# Patient Record
Sex: FEMALE | Race: BLACK OR AFRICAN AMERICAN
[De-identification: names, ages, dates, MRNs, and addresses within clinical notes are randomized per-mention and may not be internally consistent; named-entity substitution may affect disease eponyms.]

---

## 2018-02-23 ENCOUNTER — HOSPITAL ENCOUNTER (INPATIENT)
Dept: HOSPITAL 17 - NEPD | Age: 58
LOS: 7 days | Discharge: HOME | DRG: 623 | End: 2018-03-02
Attending: HOSPITALIST | Admitting: HOSPITALIST
Payer: COMMERCIAL

## 2018-02-23 VITALS — BODY MASS INDEX: 42.87 KG/M2 | HEIGHT: 66 IN | WEIGHT: 266.76 LBS

## 2018-02-23 VITALS — SYSTOLIC BLOOD PRESSURE: 247 MMHG | DIASTOLIC BLOOD PRESSURE: 109 MMHG | HEART RATE: 53 BPM

## 2018-02-23 VITALS
SYSTOLIC BLOOD PRESSURE: 205 MMHG | HEART RATE: 75 BPM | DIASTOLIC BLOOD PRESSURE: 92 MMHG | RESPIRATION RATE: 18 BRPM | OXYGEN SATURATION: 100 %

## 2018-02-23 VITALS
HEART RATE: 64 BPM | RESPIRATION RATE: 18 BRPM | DIASTOLIC BLOOD PRESSURE: 116 MMHG | SYSTOLIC BLOOD PRESSURE: 220 MMHG | OXYGEN SATURATION: 100 %

## 2018-02-23 VITALS
DIASTOLIC BLOOD PRESSURE: 82 MMHG | SYSTOLIC BLOOD PRESSURE: 198 MMHG | RESPIRATION RATE: 18 BRPM | OXYGEN SATURATION: 100 % | HEART RATE: 69 BPM

## 2018-02-23 VITALS
TEMPERATURE: 98.4 F | OXYGEN SATURATION: 99 % | SYSTOLIC BLOOD PRESSURE: 231 MMHG | HEART RATE: 63 BPM | RESPIRATION RATE: 16 BRPM | DIASTOLIC BLOOD PRESSURE: 98 MMHG

## 2018-02-23 VITALS
HEART RATE: 61 BPM | DIASTOLIC BLOOD PRESSURE: 104 MMHG | OXYGEN SATURATION: 100 % | RESPIRATION RATE: 23 BRPM | SYSTOLIC BLOOD PRESSURE: 229 MMHG

## 2018-02-23 VITALS
RESPIRATION RATE: 16 BRPM | DIASTOLIC BLOOD PRESSURE: 105 MMHG | SYSTOLIC BLOOD PRESSURE: 240 MMHG | OXYGEN SATURATION: 98 % | HEART RATE: 59 BPM

## 2018-02-23 VITALS — RESPIRATION RATE: 16 BRPM | HEART RATE: 76 BPM | OXYGEN SATURATION: 98 %

## 2018-02-23 DIAGNOSIS — Z79.84: ICD-10-CM

## 2018-02-23 DIAGNOSIS — I10: ICD-10-CM

## 2018-02-23 DIAGNOSIS — E66.01: ICD-10-CM

## 2018-02-23 DIAGNOSIS — L97.319: ICD-10-CM

## 2018-02-23 DIAGNOSIS — E87.6: ICD-10-CM

## 2018-02-23 DIAGNOSIS — R07.89: ICD-10-CM

## 2018-02-23 DIAGNOSIS — Z91.19: ICD-10-CM

## 2018-02-23 DIAGNOSIS — E11.622: Primary | ICD-10-CM

## 2018-02-23 DIAGNOSIS — L03.115: ICD-10-CM

## 2018-02-23 DIAGNOSIS — Z79.4: ICD-10-CM

## 2018-02-23 DIAGNOSIS — B95.61: ICD-10-CM

## 2018-02-23 LAB
ALBUMIN SERPL-MCNC: 4 GM/DL (ref 3.4–5)
ALP SERPL-CCNC: 104 U/L (ref 45–117)
ALT SERPL-CCNC: 21 U/L (ref 10–53)
AST SERPL-CCNC: 14 U/L (ref 15–37)
BASOPHILS # BLD AUTO: 0.1 TH/MM3 (ref 0–0.2)
BASOPHILS NFR BLD: 0.8 % (ref 0–2)
BILIRUB SERPL-MCNC: 0.3 MG/DL (ref 0.2–1)
BUN SERPL-MCNC: 12 MG/DL (ref 7–18)
CALCIUM SERPL-MCNC: 9.5 MG/DL (ref 8.5–10.1)
CHLORIDE SERPL-SCNC: 103 MEQ/L (ref 98–107)
CREAT SERPL-MCNC: 0.9 MG/DL (ref 0.5–1)
CRP SERPL-MCNC: 0.82 MG/DL (ref 0–0.3)
EOSINOPHIL # BLD: 0.3 TH/MM3 (ref 0–0.4)
EOSINOPHIL NFR BLD: 3.4 % (ref 0–4)
ERYTHROCYTE [DISTWIDTH] IN BLOOD BY AUTOMATED COUNT: 14.8 % (ref 11.6–17.2)
GFR SERPLBLD BASED ON 1.73 SQ M-ARVRAT: 78 ML/MIN (ref 89–?)
GLUCOSE SERPL-MCNC: 80 MG/DL (ref 74–106)
HCO3 BLD-SCNC: 31.1 MEQ/L (ref 21–32)
HCT VFR BLD CALC: 39 % (ref 35–46)
HGB BLD-MCNC: 13 GM/DL (ref 11.6–15.3)
INR PPP: 1 RATIO
LYMPHOCYTES # BLD AUTO: 2.7 TH/MM3 (ref 1–4.8)
LYMPHOCYTES NFR BLD AUTO: 34.5 % (ref 9–44)
MCH RBC QN AUTO: 28.7 PG (ref 27–34)
MCHC RBC AUTO-ENTMCNC: 33.3 % (ref 32–36)
MCV RBC AUTO: 86.1 FL (ref 80–100)
MONOCYTE #: 0.6 TH/MM3 (ref 0–0.9)
MONOCYTES NFR BLD: 7.3 % (ref 0–8)
NEUTROPHILS # BLD AUTO: 4.2 TH/MM3 (ref 1.8–7.7)
NEUTROPHILS NFR BLD AUTO: 54 % (ref 16–70)
PLATELET # BLD: 446 TH/MM3 (ref 150–450)
PMV BLD AUTO: 7.8 FL (ref 7–11)
PROT SERPL-MCNC: 8 GM/DL (ref 6.4–8.2)
PROTHROMBIN TIME: 10.1 SEC (ref 9.8–11.6)
RBC # BLD AUTO: 4.53 MIL/MM3 (ref 4–5.3)
SODIUM SERPL-SCNC: 139 MEQ/L (ref 136–145)
WBC # BLD AUTO: 7.7 TH/MM3 (ref 4–11)

## 2018-02-23 PROCEDURE — 84484 ASSAY OF TROPONIN QUANT: CPT

## 2018-02-23 PROCEDURE — 85652 RBC SED RATE AUTOMATED: CPT

## 2018-02-23 PROCEDURE — 96375 TX/PRO/DX INJ NEW DRUG ADDON: CPT

## 2018-02-23 PROCEDURE — 85025 COMPLETE CBC W/AUTO DIFF WBC: CPT

## 2018-02-23 PROCEDURE — 71045 X-RAY EXAM CHEST 1 VIEW: CPT

## 2018-02-23 PROCEDURE — 87206 SMEAR FLUORESCENT/ACID STAI: CPT

## 2018-02-23 PROCEDURE — 80053 COMPREHEN METABOLIC PANEL: CPT

## 2018-02-23 PROCEDURE — 93005 ELECTROCARDIOGRAM TRACING: CPT

## 2018-02-23 PROCEDURE — 87070 CULTURE OTHR SPECIMN AEROBIC: CPT

## 2018-02-23 PROCEDURE — 96366 THER/PROPH/DIAG IV INF ADDON: CPT

## 2018-02-23 PROCEDURE — 85610 PROTHROMBIN TIME: CPT

## 2018-02-23 PROCEDURE — 82948 REAGENT STRIP/BLOOD GLUCOSE: CPT

## 2018-02-23 PROCEDURE — 83735 ASSAY OF MAGNESIUM: CPT

## 2018-02-23 PROCEDURE — 87116 MYCOBACTERIA CULTURE: CPT

## 2018-02-23 PROCEDURE — G0378 HOSPITAL OBSERVATION PER HR: HCPCS

## 2018-02-23 PROCEDURE — 80048 BASIC METABOLIC PNL TOTAL CA: CPT

## 2018-02-23 PROCEDURE — 85027 COMPLETE CBC AUTOMATED: CPT

## 2018-02-23 PROCEDURE — 87205 SMEAR GRAM STAIN: CPT

## 2018-02-23 PROCEDURE — 87102 FUNGUS ISOLATION CULTURE: CPT

## 2018-02-23 PROCEDURE — 73590 X-RAY EXAM OF LOWER LEG: CPT

## 2018-02-23 PROCEDURE — 85730 THROMBOPLASTIN TIME PARTIAL: CPT

## 2018-02-23 PROCEDURE — 87186 SC STD MICRODIL/AGAR DIL: CPT

## 2018-02-23 PROCEDURE — A9579 GAD-BASE MR CONTRAST NOS,1ML: HCPCS

## 2018-02-23 PROCEDURE — 96376 TX/PRO/DX INJ SAME DRUG ADON: CPT

## 2018-02-23 PROCEDURE — 96372 THER/PROPH/DIAG INJ SC/IM: CPT

## 2018-02-23 PROCEDURE — 73723 MRI JOINT LWR EXTR W/O&W/DYE: CPT

## 2018-02-23 PROCEDURE — 86403 PARTICLE AGGLUT ANTBDY SCRN: CPT

## 2018-02-23 PROCEDURE — 87147 CULTURE TYPE IMMUNOLOGIC: CPT

## 2018-02-23 PROCEDURE — 87015 SPECIMEN INFECT AGNT CONCNTJ: CPT

## 2018-02-23 PROCEDURE — 88305 TISSUE EXAM BY PATHOLOGIST: CPT

## 2018-02-23 PROCEDURE — 86140 C-REACTIVE PROTEIN: CPT

## 2018-02-23 RX ADMIN — CEFEPIME SCH MLS/HR: 1 INJECTION, POWDER, FOR SOLUTION INTRAMUSCULAR; INTRAVENOUS at 22:10

## 2018-02-23 RX ADMIN — ACYCLOVIR SCH UNITS: 800 TABLET ORAL at 22:11

## 2018-02-23 NOTE — HHI.HP
__________________________________________________





HPI


Service


[facility]


Primary Care Physician


No Primary Care Physician


Admission Diagnosis





Diagnoses:  


(1) Diabetic foot infection


Diagnosis:  Principal





(2) HTN (hypertension)


Diagnosis:  Principal





(3) DM (diabetes mellitus)


Diagnosis:  Principal





Travel History


International Travel<30 Days:  No


Contact w/Intl Traveler <30 Da:  No


Traveled to Known Affected Are:  No


History of Present Illness


This is a 57-year-old female with a PMH of HTN and DM who presented to the ER 

with complaints of right leg wound x2-3 months.  States symptoms have been 

intermittent over the last several months, has not sought medical attention 

until now.  Denies fever or chills.  States she was seen at Urgent Care today 

and referred to the ER.  Reports associated leg pain, sharp, intermittent, 6/10

, non-radiating, worse w/ movement/touch.  On arrival, /105, HR 59, O2 

sat 98% on RA, Afebrile.  CBC unremarkable.  Chemistry essentially 

unremarkable.  CRP 0.82.  I 1.0.  Tib-fib X-ray with no evidence of 

osteoarthritis.  S/p Vanc in ER.





Review of Systems


Except as stated in HPI:  all other systems reviewed are Neg


ROS: 14 point review of systems otherwise negative.





Past Family Social History


Past Medical History


PMH:  HTN and DM


Past Surgical History


PAST SURGICAL HISTORY:  None


Allergies:  


Coded Allergies:  


     amlodipine (Verified  Allergy, Unknown, 18)


Family History


PAST FAMILY HISTORY:  Reviewed.  No h/o DM or CAD


Social History


PAST SOCIAL HISTORY:  Negative for alcohol, tobacco or drugs.





Physical Exam


Vital Signs





Vital Signs








  Date Time  Temp Pulse Resp B/P (MAP) Pulse Ox O2 Delivery O2 Flow Rate FiO2


 


18 21:04  61 23 229/104 (145) 100 Room Air  


 


18 20:44  59 16 240/105 (150) 98 Room Air  


 


18 20:13  76 16  98 Room Air  


 


18 16:12 98.4 63 16 231/98 (142) 99 Room Air  








Physical Exam


PE:


GENERAL: Very pleasant middle-aged white female in no acute distress.


HEENT: PERRLA, EOMI. No scleral icterus or conjunctival pallor. No lid lag or 

facial droop.  


CARDIOVASCULAR: Regular rate and rhythm.  No obvious murmurs to auscultation. 

No chest tenderness to palpation. 


RESPIRATORY: No obvious rhonchi or wheezing. Clear to auscultation. Breath 

sounds equal bilaterally. 


GASTROINTESTINAL: Abdomen soft, non-tender, nondistended. BS normal. 


MUSCULOSKELETAL: Extremities without clubbing, cyanosis, or edema. No obvious 

deformities. RLE w/ ulcer, surrounding erythema, purulent drainage. Pulses 

intact.


NEUROLOGICAL: Awake, alert and oriented x4. No focal neurologic deficits. 

Moving both upper and lower extremities spontaneously.


Laboratory





Laboratory Tests








Test


  18


20:31


 


White Blood Count 7.7 


 


Red Blood Count 4.53 


 


Hemoglobin 13.0 


 


Hematocrit 39.0 


 


Mean Corpuscular Volume 86.1 


 


Mean Corpuscular Hemoglobin 28.7 


 


Mean Corpuscular Hemoglobin


Concent 33.3 


 


 


Red Cell Distribution Width 14.8 


 


Platelet Count 446 


 


Mean Platelet Volume 7.8 


 


Neutrophils (%) (Auto) 54.0 


 


Lymphocytes (%) (Auto) 34.5 


 


Monocytes (%) (Auto) 7.3 


 


Eosinophils (%) (Auto) 3.4 


 


Basophils (%) (Auto) 0.8 


 


Neutrophils # (Auto) 4.2 


 


Lymphocytes # (Auto) 2.7 


 


Monocytes # (Auto) 0.6 


 


Eosinophils # (Auto) 0.3 


 


Basophils # (Auto) 0.1 


 


CBC Comment DIFF FINAL 


 


Differential Comment  


 


Erythrocyte Sedimentation Rate 13 


 


Prothrombin Time 10.1 


 


Prothromb Time International


Ratio 1.0 


 


 


Activated Partial


Thromboplast Time 24.1 


 


 


Blood Urea Nitrogen 12 


 


Creatinine 0.90 


 


Random Glucose 80 


 


Total Protein 8.0 


 


Albumin 4.0 


 


Calcium Level 9.5 


 


Alkaline Phosphatase 104 


 


Aspartate Amino Transf


(AST/SGOT) 14 


 


 


Alanine Aminotransferase


(ALT/SGPT) 21 


 


 


Total Bilirubin 0.3 


 


Sodium Level 139 


 


Potassium Level 3.3 


 


Chloride Level 103 


 


Carbon Dioxide Level 31.1 


 


Anion Gap 5 


 


Estimat Glomerular Filtration


Rate 78 


 


 


C-Reactive Protein 0.82 














 Date/Time


Source Procedure


Growth Status


 


 


 18 20:31


Wound Leg Gram Stain


Pending Received


 


 18 20:31


Wound Leg Wound Culture


Pending Received








Result Diagram:  


18








Caprinfara VTE Risk Assessment


Caprini VTE Risk Assessment:  No/Low Risk (score <= 1)


Caprini Risk Assessment Model











 Point Value = 1          Point Value = 2  Point Value = 3  Point Value = 5


 


Age 41-60


Minor surgery


BMI > 25 kg/m2


Swollen legs


Varicose veins


Pregnancy or postpartum


History of unexplained or recurrent


   spontaneous 


Oral contraceptives or hormone


   replacement


Sepsis (< 1 month)


Serious lung disease, including


   pneumonia (< 1 month)


Abnormal pulmonary function


Acute myocardial infarction


Congestive heart failure (< 1 month)


History of inflammatory bowel disease


Medical patient at bed rest Age 61-74


Arthroscopic surgery


Major open surgery (> 45 min)


Laparoscopic surgery (> 45 min)


Malignancy


Confined to bed (> 72 hours)


Immobilizing plaster cast


Central venous access Age >= 75


History of VTE


Family history of VTE


Factor V Leiden


Prothrombin 72900I


Lupus anticoagulant


Anticardiolipin antibodies


Elevated serum homocysteine


Heparin-induced thrombocytopenia


Other congenital or acquired


   thrombophilia Stroke (< 1 month)


Elective arthroplasty


Hip, pelvis, or leg fracture


Acute spinal cord injury (< 1 month)








Prophylaxis Regimen











   Total Risk


Factor Score Risk Level Prophylaxis Regimen


 


0-1      Low Early ambulation


 


2 Moderate Order ONE of the following:


*Sequential Compression Device (SCD)


*Heparin 5000 units SQ BID


 


3-4 Higher Order ONE of the following medications:


*Heparin 5000 units SQ TID


*Enoxaparin/Lovenox 40 mg SQ daily (WT < 150 kg, CrCl > 30 mL/min)


*Enoxaparin/Lovenox 30 mg SQ daily (WT < 150 kg, CrCl > 10-29 mL/min)


*Enoxaparin/Lovenox 30 mg SQ BID (WT < 150 kg, CrCl > 30 mL/min)


AND/OR


*Sequential Compression Device (SCD)


 


5 or more Highest Order ONE of the following medications:


*Heparin 5000 units SQ TID (Preferred with Epidurals)


*Enoxaparin/Lovenox 40 mg SQ daily (WT < 150 kg, CrCl > 30 mL/min)


*Enoxaparin/Lovenox 30 mg SQ daily (WT < 150 kg, CrCl > 10-29 mL/min)


*Enoxaparin/Lovenox 30 mg SQ BID (WT < 150 kg, CrCl > 30 mL/min)


AND


*Sequential Compression Device (SCD)











Assessment and Plan


Problem List:  


(1) Diabetic foot infection


ICD Code:  E11.69 - Type 2 diabetes mellitus with other specified complication; 

L08.9 - Local infection of the skin and subcutaneous tissue, unspecified


(2) HTN (hypertension)


ICD Code:  I10 - Essential (primary) hypertension


(3) DM (diabetes mellitus)


ICD Code:  E11.9 - Type 2 diabetes mellitus without complications


Assessment and Plan


A/P:


1.  Diabetic Foot Infection:  ulcer w/ surrounding cellulitis, ongoing for 

approx 2-3mo per patient.  Tib-Fib X-ray negative for Osteomyelitis, images 

reviewed by me.  Afebrile, no leukocytosis.  Follow up Wound Culture, Continue 

IV Abx, Wound Management consult for further eval/treatment.  


2.  HTN:  Uncontrolled.  -240's systolic, reports compliance w/ 

medications, states has no PCP but has 3mo prescription from last visit.  S/p 

Hydralazine w/ persistent hypertension.  Hydralazine 10mg IV x1 now, resume 

home medications, monitor BP.  


3.  DM:  Sliding scale w/ Accu-Cheks, resume home Metformin and Insulin. 


4.  DVT Prophylaxis:  Heparin sq


5.  Social work for d/c planning as needed. 


6.  Case discussed w/ ER physician at length, labs/records/imaging reviewed by 

me.











Natalie Solis MD 2018 21:50

## 2018-02-23 NOTE — PD
HPI


Chief Complaint:  Skin Problem


Time Seen by Provider:  19:13


Travel History


International Travel<30 days:  No


Contact w/Intl Traveler<30days:  No


Traveled to known affect area:  No





History of Present Illness


HPI


57-year-old female with history of hypertension, insulin-dependent diabetes, 

here for evaluation of right leg wound.  The patient reports that the wound 

started in September 2017 after a scratch, and has progressively become larger.

  She went to an urgent care facility today to have it evaluated and was sent 

here.  Patient reports that the pain is 5 out of 10, constant, worse at night'

s.  She denies any fevers or chills.  No other wounds.





PFSH


Past Medical History


Diabetes:  Yes





Social History


Tobacco Use:  No





Allergies-Medications


(Allergen,Severity, Reaction):  


Coded Allergies:  


     amlodipine (Verified  Allergy, Unknown, 2/23/18)


Reported Meds & Prescriptions





Reported Meds & Active Scripts


Active


Reported


Quinapril (Quinapril HCl) 5 Mg Tab 5 Mg PO BID


Diltiazem (Diltiazem HCl) 60 Mg Tab 60 Mg PO QID


Lantus Inj (Insulin Glargine) 1,000 Unit/10 Ml Vial 34 Units SQ HS


Humalog Inj (Insulin Human Lispro) 1,000 Unit/10 Ml Vial 2-12 Units SQ ACHS


     Max dose at bedtime:(  )units; sugars < 70,(0)units; sugars


     150-199,(2)units; sugars 200-249,(4)units; sugars 250-299,(7)units;


     sugars 300-349,(10)units; sugars more than 349,(12)units.


Hydrochlorothiazide 50 Mg Tab 50 Mg PO DAILY


Synthroid (Levothyroxine Sodium) 175 Mcg Tab 175 Mcg PO DAILY


Metformin (Metformin HCl) 1,000 Mg Tab 1,000 Mg PO BIDPC








Review of Systems


Except as stated in HPI:  all other systems reviewed are Neg





Physical Exam


Narrative


GENERAL: Well-developed, well-nourished, comfortable, no apparent distress.


SKIN: Focused skin assessment warm/dry.  Approximately 5x6 cm circular 

ulcerative wound to the right lateral/distal leg with purulent drainage with 

mild surrounding warmth and erythema, no crepitus.


HEAD: Atraumatic. Normocephalic. 


EYES: Pupils equal and round. No scleral icterus. No injection or drainage. 


ENT: Mucous membranes pink and moist.


NECK: Trachea midline. No JVD. 


CARDIOVASCULAR: Regular rate and rhythm.  No murmur appreciated.


RESPIRATORY: No accessory muscle use. Clear to auscultation. Breath sounds 

equal bilaterally. 


GASTROINTESTINAL: Abdomen soft, non-tender, nondistended. 


MUSCULOSKELETAL: No obvious deformities. No clubbing.  No cyanosis.  No edema. 


NEUROLOGICAL: Awake and alert. No obvious cranial nerve deficits.  Motor 

grossly within normal limits. Normal speech.


PSYCHIATRIC: Appropriate mood and affect; insight and judgment normal.





Data


Data


Last Documented VS





Vital Signs








  Date Time  Temp Pulse Resp B/P (MAP) Pulse Ox O2 Delivery O2 Flow Rate FiO2


 


2/23/18 21:49  64 18  100 Room Air  





    220/116 (150)    


 


2/23/18 16:12 98.4       








Orders





 Orders


Complete Blood Count With Diff (2/23/18 19:20)


Comprehensive Metabolic Panel (2/23/18 19:20)


Prothrombin Time / Inr (Pt) (2/23/18 19:20)


Act Partial Throm Time (Ptt) (2/23/18 19:20)


Iv Access Insert/Monitor (2/23/18 19:20)


Ecg Monitoring (2/23/18 19:20)


Oximetry (2/23/18 19:20)


Sodium Chloride 0.9% Flush (Ns Flush) (2/23/18 19:30)


Wound Culture And Gram Stain (2/23/18 19:20)


Westergren Sedimentation Rate (2/23/18 19:20)


C-Reactive Protein (Crp) (2/23/18 19:20)


Tibia/Fibula (Ap/Lat) (2/23/18 )


Vancomycin Inj (Vancomycin Inj) (2/23/18 19:30)


Hydralazine Inj (Apresoline Inj) (2/23/18 22:00)


Bedside Glucose FAIZA.CSUGAR (2/23/18 21:47)


Blood Glucose Goal (Criteria) (2/23/18 21:47)


Hypoglycemia 70 Mg/Dl Or < (2/23/18 21:47)


Notify Dr: Other (2/23/18 21:47)


Dextrose 50% In Minerva (Vial) Inj (D50w (Vi (2/23/18 22:00)


Glucagon Inj (Glucagon Inj) (2/23/18 22:00)


Insulin Aspart Supplemtl Scale (Novolog (2/24/18 08:00)


Vancomycin Consult Pharmacy (Vancomycin (2/23/18 22:00)


Cefepime Inj (Maxipime Inj) (2/23/18 22:00)


Place In Observation (2/23/18 )


Vital Signs (Adult) Q4H (2/23/18 21:47)


Activity Oob With Assistance (2/23/18 21:47)


Intake + Output FAIZA.QSHIFT (2/23/18 21:47)


Diet 1800 Ada Cons Carb (2/24/18 Breakfast)


Sodium Chloride 0.9% Flush (Ns Flush) (2/23/18 22:00)


Sodium Chloride 0.9% Flush (Ns Flush) (2/24/18 09:00)


Ondansetron Inj (Zofran Inj) (2/23/18 22:00)


Comprehensive Metabolic Panel (2/24/18 06:00)


Complete Blood Count With Diff (2/24/18 06:00)


Case Management Consult (2/23/18 21:47)


Consult Wound / Ostomy Nurse (2/23/18 21:47)


Heparin Inj (Heparin Inj) (2/24/18 09:00)


Acetaminophen (Tylenol) (2/23/18 22:00)


Acetamin-Hydrocod 325-5 Mg (Norco  5-325 (2/23/18 22:00)


Morphine Inj (Morphine Inj) (2/23/18 22:00)


Docusate Sodium-Senna (Irma-Colace) (2/24/18 09:00)


Magnesium Hydroxide Liq (Milk Of Magnesi (2/23/18 22:00)


Sennosides (Senokot) (2/23/18 22:00)


Bisacodyl Supp (Dulcolax Supp) (2/23/18 22:00)


Lactulose Liq (Lactulose Liq) (2/23/18 22:00)


Diltiazem (Cardizem) (2/24/18 09:00)


Hydrochlorothiazide (Hydrodiuril) (2/24/18 09:00)


Metformin (Glucophage) (2/24/18 09:00)


Lisinopril (Prinivil) (2/24/18 09:00)


Levothyroxine (Synthroid) (2/24/18 06:00)


Admit Order (Ed Use Only) (2/23/18 21:50)


Insulin Detemir Inj (Levemir Inj) (2/23/18 22:00)





Labs





Laboratory Tests








Test


  2/23/18


20:31


 


White Blood Count 7.7 TH/MM3 


 


Red Blood Count 4.53 MIL/MM3 


 


Hemoglobin 13.0 GM/DL 


 


Hematocrit 39.0 % 


 


Mean Corpuscular Volume 86.1 FL 


 


Mean Corpuscular Hemoglobin 28.7 PG 


 


Mean Corpuscular Hemoglobin


Concent 33.3 % 


 


 


Red Cell Distribution Width 14.8 % 


 


Platelet Count 446 TH/MM3 


 


Mean Platelet Volume 7.8 FL 


 


Neutrophils (%) (Auto) 54.0 % 


 


Lymphocytes (%) (Auto) 34.5 % 


 


Monocytes (%) (Auto) 7.3 % 


 


Eosinophils (%) (Auto) 3.4 % 


 


Basophils (%) (Auto) 0.8 % 


 


Neutrophils # (Auto) 4.2 TH/MM3 


 


Lymphocytes # (Auto) 2.7 TH/MM3 


 


Monocytes # (Auto) 0.6 TH/MM3 


 


Eosinophils # (Auto) 0.3 TH/MM3 


 


Basophils # (Auto) 0.1 TH/MM3 


 


CBC Comment DIFF FINAL 


 


Differential Comment  


 


Erythrocyte Sedimentation Rate 13 mm/hr 


 


Prothrombin Time 10.1 SEC 


 


Prothromb Time International


Ratio 1.0 RATIO 


 


 


Activated Partial


Thromboplast Time 24.1 SEC 


 


 


Blood Urea Nitrogen 12 MG/DL 


 


Creatinine 0.90 MG/DL 


 


Random Glucose 80 MG/DL 


 


Total Protein 8.0 GM/DL 


 


Albumin 4.0 GM/DL 


 


Calcium Level 9.5 MG/DL 


 


Alkaline Phosphatase 104 U/L 


 


Aspartate Amino Transf


(AST/SGOT) 14 U/L 


 


 


Alanine Aminotransferase


(ALT/SGPT) 21 U/L 


 


 


Total Bilirubin 0.3 MG/DL 


 


Sodium Level 139 MEQ/L 


 


Potassium Level 3.3 MEQ/L 


 


Chloride Level 103 MEQ/L 


 


Carbon Dioxide Level 31.1 MEQ/L 


 


Anion Gap 5 MEQ/L 


 


Estimat Glomerular Filtration


Rate 78 ML/MIN 


 


 


C-Reactive Protein 0.82 MG/DL 











[facility]


Medical Decision Making


Medical Screen Exam Complete:  Yes


Emergency Medical Condition:  Yes


Differential Diagnosis


Diabetic foot wound, osteomyelitis, cellulitis


Narrative Course


Initial vital signs show heart rate 63, blood pressure 231/98, pulse ox 99% on 

room air, oral temp of 98.4 from a.





CBC is essentially unremarkable.


CMP is remarkable for potassium 3.3, otherwise unremarkable.


ESR is 13.


CRP is 0.82.





Right tib-fib x-ray:


No plain radiograph evidence for osteomyelitis.





The patient was given a dose of IV vancomycin.  She does have a pretty 

significant sized ulceration to her right distal leg with mild surrounding 

warmth and erythema as well as some purulent drainage.  There is moderate 

tenderness around the wound.  There is no crepitus.  Patient also has a 

significantly high blood pressure.  She is not displaying any signs or symptoms 

of hypertensive crisis.  Patient will be admitted for further IV antibiotic 

therapy, blood pressure control, and likely podiatry consultation regarding her 

diabetic leg wound.  Case discussed with hospitalist Dr. Solis who will admit 

the patient to her service.





Diagnosis





 Primary Impression:  


 Diabetic leg ulcer


 Additional Impression:  


 Uncontrolled hypertension





Admitting Information


Admitting Physician Requests:  Observation











Seymour Bartholomew MD Feb 23, 2018 20:37

## 2018-02-23 NOTE — RADRPT
EXAM DATE/TIME:  02/23/2018 19:30 

 

[facility] COMPARISON:     

No previous studies available for comparison.

 

                     

INDICATIONS :     

Ulcer to lateral lower leg. Possible Osteomyelitis.

                     

 

MEDICAL HISTORY :     

Diabetes mellitus type I.          

 

SURGICAL HISTORY :     

None.   

 

ENCOUNTER:     

Initial                                        

 

ACUITY:     

1 month      

 

PAIN SCORE:     

7/10

 

LOCATION:     

Right lateral 

 

FINDINGS:     

Two view examination of the right tibia demonstrates no evidence of fracture or dislocation.  There i
s soft tissue injury in the lateral distal calf region. No significant periosteal reaction or bony er
osion. Bony mineralization is normal.  The soft tissue structures are intact.

 

CONCLUSION:     

1. No plain radiograph evidence for osteomyelitis as questioned.

 

 

 

 Marlo Montilla MD on February 23, 2018 at 19:45           

Board Certified Radiologist.

 This report was verified electronically.

## 2018-02-24 VITALS
SYSTOLIC BLOOD PRESSURE: 174 MMHG | TEMPERATURE: 98.5 F | HEART RATE: 64 BPM | OXYGEN SATURATION: 99 % | RESPIRATION RATE: 18 BRPM | DIASTOLIC BLOOD PRESSURE: 81 MMHG

## 2018-02-24 VITALS — DIASTOLIC BLOOD PRESSURE: 70 MMHG | SYSTOLIC BLOOD PRESSURE: 167 MMHG

## 2018-02-24 VITALS
HEART RATE: 82 BPM | RESPIRATION RATE: 18 BRPM | DIASTOLIC BLOOD PRESSURE: 84 MMHG | SYSTOLIC BLOOD PRESSURE: 182 MMHG | OXYGEN SATURATION: 100 %

## 2018-02-24 VITALS
TEMPERATURE: 97.9 F | RESPIRATION RATE: 18 BRPM | HEART RATE: 70 BPM | DIASTOLIC BLOOD PRESSURE: 64 MMHG | SYSTOLIC BLOOD PRESSURE: 180 MMHG | OXYGEN SATURATION: 96 %

## 2018-02-24 VITALS — HEART RATE: 66 BPM

## 2018-02-24 VITALS
RESPIRATION RATE: 16 BRPM | HEART RATE: 71 BPM | TEMPERATURE: 98.4 F | OXYGEN SATURATION: 97 % | DIASTOLIC BLOOD PRESSURE: 76 MMHG | SYSTOLIC BLOOD PRESSURE: 160 MMHG

## 2018-02-24 VITALS
DIASTOLIC BLOOD PRESSURE: 40 MMHG | SYSTOLIC BLOOD PRESSURE: 160 MMHG | HEART RATE: 72 BPM | OXYGEN SATURATION: 96 % | TEMPERATURE: 98.3 F | RESPIRATION RATE: 20 BRPM

## 2018-02-24 VITALS
HEART RATE: 76 BPM | DIASTOLIC BLOOD PRESSURE: 68 MMHG | RESPIRATION RATE: 17 BRPM | SYSTOLIC BLOOD PRESSURE: 173 MMHG | TEMPERATURE: 98.1 F | OXYGEN SATURATION: 95 %

## 2018-02-24 VITALS — HEART RATE: 71 BPM | DIASTOLIC BLOOD PRESSURE: 81 MMHG | SYSTOLIC BLOOD PRESSURE: 182 MMHG

## 2018-02-24 VITALS — HEART RATE: 71 BPM

## 2018-02-24 LAB
ALBUMIN SERPL-MCNC: 3.3 GM/DL (ref 3.4–5)
ALP SERPL-CCNC: 85 U/L (ref 45–117)
ALT SERPL-CCNC: 19 U/L (ref 10–53)
AST SERPL-CCNC: 14 U/L (ref 15–37)
BASOPHILS # BLD AUTO: 0 TH/MM3 (ref 0–0.2)
BASOPHILS NFR BLD: 0.4 % (ref 0–2)
BILIRUB SERPL-MCNC: 0.4 MG/DL (ref 0.2–1)
BUN SERPL-MCNC: 15 MG/DL (ref 7–18)
CALCIUM SERPL-MCNC: 8.6 MG/DL (ref 8.5–10.1)
CHLORIDE SERPL-SCNC: 103 MEQ/L (ref 98–107)
CREAT SERPL-MCNC: 0.8 MG/DL (ref 0.5–1)
EOSINOPHIL # BLD: 0.1 TH/MM3 (ref 0–0.4)
EOSINOPHIL NFR BLD: 1.1 % (ref 0–4)
ERYTHROCYTE [DISTWIDTH] IN BLOOD BY AUTOMATED COUNT: 15 % (ref 11.6–17.2)
GFR SERPLBLD BASED ON 1.73 SQ M-ARVRAT: 89 ML/MIN (ref 89–?)
GLUCOSE SERPL-MCNC: 139 MG/DL (ref 74–106)
HCO3 BLD-SCNC: 25.8 MEQ/L (ref 21–32)
HCT VFR BLD CALC: 35.1 % (ref 35–46)
HGB BLD-MCNC: 12.2 GM/DL (ref 11.6–15.3)
LYMPHOCYTES # BLD AUTO: 2.1 TH/MM3 (ref 1–4.8)
LYMPHOCYTES NFR BLD AUTO: 27 % (ref 9–44)
MCH RBC QN AUTO: 29.7 PG (ref 27–34)
MCHC RBC AUTO-ENTMCNC: 34.8 % (ref 32–36)
MCV RBC AUTO: 85.4 FL (ref 80–100)
MONOCYTE #: 0.6 TH/MM3 (ref 0–0.9)
MONOCYTES NFR BLD: 7.5 % (ref 0–8)
NEUTROPHILS # BLD AUTO: 5 TH/MM3 (ref 1.8–7.7)
NEUTROPHILS NFR BLD AUTO: 64 % (ref 16–70)
PLATELET # BLD: 369 TH/MM3 (ref 150–450)
PMV BLD AUTO: 7.4 FL (ref 7–11)
PROT SERPL-MCNC: 6.9 GM/DL (ref 6.4–8.2)
RBC # BLD AUTO: 4.11 MIL/MM3 (ref 4–5.3)
SODIUM SERPL-SCNC: 137 MEQ/L (ref 136–145)
WBC # BLD AUTO: 7.7 TH/MM3 (ref 4–11)

## 2018-02-24 RX ADMIN — DILTIAZEM HYDROCHLORIDE SCH MG: 60 TABLET, FILM COATED ORAL at 09:24

## 2018-02-24 RX ADMIN — INSULIN ASPART SCH: 100 INJECTION, SOLUTION INTRAVENOUS; SUBCUTANEOUS at 21:08

## 2018-02-24 RX ADMIN — STANDARDIZED SENNA CONCENTRATE AND DOCUSATE SODIUM SCH TAB: 8.6; 5 TABLET, FILM COATED ORAL at 09:24

## 2018-02-24 RX ADMIN — LISINOPRIL SCH MG: 5 TABLET ORAL at 21:08

## 2018-02-24 RX ADMIN — LISINOPRIL SCH MG: 5 TABLET ORAL at 09:27

## 2018-02-24 RX ADMIN — DILTIAZEM HYDROCHLORIDE SCH MG: 60 TABLET, FILM COATED ORAL at 18:56

## 2018-02-24 RX ADMIN — DILTIAZEM HYDROCHLORIDE SCH MG: 60 TABLET, FILM COATED ORAL at 21:07

## 2018-02-24 RX ADMIN — ACETAMINOPHEN PRN MG: 325 TABLET ORAL at 04:40

## 2018-02-24 RX ADMIN — DILTIAZEM HYDROCHLORIDE SCH MG: 60 TABLET, FILM COATED ORAL at 13:27

## 2018-02-24 RX ADMIN — SODIUM CHLORIDE SCH MLS/HR: 900 INJECTION INTRAVENOUS at 04:41

## 2018-02-24 RX ADMIN — INSULIN ASPART SCH: 100 INJECTION, SOLUTION INTRAVENOUS; SUBCUTANEOUS at 17:00

## 2018-02-24 RX ADMIN — ACYCLOVIR SCH UNITS: 800 TABLET ORAL at 21:09

## 2018-02-24 RX ADMIN — STANDARDIZED SENNA CONCENTRATE AND DOCUSATE SODIUM SCH TAB: 8.6; 5 TABLET, FILM COATED ORAL at 20:50

## 2018-02-24 RX ADMIN — INSULIN ASPART SCH: 100 INJECTION, SOLUTION INTRAVENOUS; SUBCUTANEOUS at 08:31

## 2018-02-24 RX ADMIN — LEVOTHYROXINE SODIUM SCH MCG: 150 TABLET ORAL at 06:29

## 2018-02-24 RX ADMIN — CEFEPIME SCH MLS/HR: 1 INJECTION, POWDER, FOR SOLUTION INTRAMUSCULAR; INTRAVENOUS at 23:34

## 2018-02-24 RX ADMIN — Medication SCH ML: at 09:28

## 2018-02-24 RX ADMIN — INSULIN ASPART SCH: 100 INJECTION, SOLUTION INTRAVENOUS; SUBCUTANEOUS at 13:03

## 2018-02-24 RX ADMIN — CEFEPIME SCH MLS/HR: 1 INJECTION, POWDER, FOR SOLUTION INTRAMUSCULAR; INTRAVENOUS at 09:30

## 2018-02-24 RX ADMIN — HYDROCHLOROTHIAZIDE SCH MG: 50 TABLET ORAL at 09:28

## 2018-02-24 RX ADMIN — LEVOTHYROXINE SODIUM SCH MCG: 25 TABLET ORAL at 06:29

## 2018-02-24 RX ADMIN — Medication SCH ML: at 20:49

## 2018-02-24 RX ADMIN — HEPARIN SODIUM SCH UNITS: 10000 INJECTION, SOLUTION INTRAVENOUS; SUBCUTANEOUS at 21:07

## 2018-02-24 RX ADMIN — GENTAMICIN SULFATE SCH APPLIC: 1 CREAM TOPICAL at 18:56

## 2018-02-24 RX ADMIN — HEPARIN SODIUM SCH UNITS: 10000 INJECTION, SOLUTION INTRAVENOUS; SUBCUTANEOUS at 09:29

## 2018-02-24 NOTE — HHI.PR
Subjective


Remarks


Follow up right lower leg wound. Patient reports pain in the right lower leg. 

No fever/chills. Denies chest pain, dyspnea.





Objective


Vitals





Vital Signs








  Date Time  Temp Pulse Resp B/P (MAP) Pulse Ox O2 Delivery O2 Flow Rate FiO2


 


2/24/18 12:12 98.3 72 20 160/40 (80) 96   


 


2/24/18 08:02  71      


 


2/24/18 07:39 98.4 71 16 160/76 (104) 97   


 


2/24/18 03:16 98.1 76 17 173/68 (103) 95   


 


2/24/18 02:34        


 


2/24/18 02:08    167/70 (102)    


 


2/24/18 01:00  82 18 182/84 (116) 100 Room Air  


 


2/24/18 00:19  71  182/81 (114)    


 


2/23/18 23:30  69 18 198/82 (120) 100 Room Air  


 


2/23/18 22:43  75 18 205/92 (129) 100 Room Air  


 


2/23/18 22:01  53  247/109 (155)    


 


2/23/18 21:49  64 18  100 Room Air  





    220/116 (150)    


 


2/23/18 21:04  61 23 229/104 (145) 100 Room Air  


 


2/23/18 20:44  59 16 240/105 (150) 98 Room Air  


 


2/23/18 20:13  76 16  98 Room Air  


 


2/23/18 16:12 98.4 63 16 231/98 (142) 99 Room Air  














I/O      


 


 2/23/18 2/23/18 2/23/18 2/24/18 2/24/18 2/24/18





 07:00 15:00 23:00 07:00 15:00 23:00


 


Intake Total     95 ml 


 


Balance     95 ml 


 


      


 


Intake IV Total     95 ml 








Result Diagram:  


2/23/18 2031 2/24/18 0705





Imaging





Last Impressions








Tibia/Fibula X-Ray 2/23/18 0000 Signed





Impressions: 





 Service Date/Time:  Friday, February 23, 2018 19:30 - CONCLUSION:  1. No plain 





 radiograph evidence for osteomyelitis as questioned.     Marlo Montilla MD 








Objective Remarks


General: No acute distress.


Heart: Regular rate and rhythm. No murmur.


Lungs: Clear to auscultation bilaterally. No wheezes, rales, or rhonchi. 

Breathing is nonlabored.


Abdomen: Soft, nontender, nondistended.


Extremities: No lower extremity edema. Lateral right lower leg ulcer with 

pustular drainage.


Psych: Alert and oriented.


Procedures





Last Impressions








Tibia/Fibula X-Ray 2/23/18 0000 Signed





Impressions: 





 Service Date/Time:  Friday, February 23, 2018 19:30 - CONCLUSION:  1. No plain 





 radiograph evidence for osteomyelitis as questioned.     Marlo Montilla MD 








Urinary Catheter:  No


Vascular Central Line Catheter:  No





A/P


Problem List:  


(1) Diabetic foot infection


ICD Code:  E11.69 - Type 2 diabetes mellitus with other specified complication; 

L08.9 - Local infection of the skin and subcutaneous tissue, unspecified


(2) HTN (hypertension)


ICD Code:  I10 - Essential (primary) hypertension


(3) DM (diabetes mellitus)


ICD Code:  E11.9 - Type 2 diabetes mellitus without complications


Assessment and Plan


1.  Ulcerative lesion, right lower extremity: This is been present for 2-3 

months.  X-ray shows no sign of osteomyelitis.  Wound culture pending.  

Continue IV antibiotics.  Wound care consult pending.  Consult podiatry.


2.  Hypertension: Poorly controlled.  Continue diltiazem, HCTZ, lisinopril.


3.  Diabetes mellitus: Monitor Accu-Cheks and cover with sliding scale insulin.

  Continue Levemir, metformin.


4.  DVT prophylaxis: Heparin.











Francisco Javier Castillo MD Feb 24, 2018 13:20

## 2018-02-24 NOTE — RADRPT
EXAM DATE/TIME:  02/24/2018 17:34 

 

[facility] COMPARISON:     

TIBIA/FIBULA RIGHT (AP/LAT), February 23, 2018, 19:30.

       

 

 

INDICATIONS :     

***Ulcer right ankle.

                     

 

CONTRAST:     

20 cc Omniscan (gadodiamide) IV

                     

 

MEDICAL HISTORY :     

Hypertension. Diabetes mellitus type 2.   

 

SURGICAL HISTORY :     

None.     

 

ENCOUNTER:     

Initial

 

ACUITY:     

1 day

 

PAIN SCORE:     

0/10

 

LOCATION:     

Right   ankle

 

TECHNIQUE:     

Multiplanar, multisequence MRI examination was performed without contrast and after the intravenous a
dministration of gadolinium.

 

FINDINGS:     

Skin markers were placed at the cranial and caudal aspect of a right lateral distal leg wound. In thi
s area there is skin thickening and subcutaneous edema and enhancement standing along the lateral asp
ect of the foot characteristic of a cellulitis. There is also subtle edema and enhancement along the 
superficial posterior aspect of the pronators longus muscle and there is mild extension of edema and 
enhancement between the peroneus longus muscle and the soleus and lateral head of the gastrocnemius m
uscle. There is mild edema and enhancement extends to abut the posterior aspect of the fibula. Howeve
r, both the fibula and tibia demonstrate normal signal intensity and no cortical abnormality is ident
ified. The tibialis posterior demonstrates no abnormality in the muscles in the anterior compartment 
are within normal limits. No fluid collection is present.

 

CONCLUSION:     

1. There is subcutaneous edema and enhancement along the lateral aspect of the distal leg adjacent to
 the area of open wound characteristic of a cellulitis.

2. There is subtle edema and enhancement of the superficial fibers of the peroneus longus muscle and 
there is mild edema and enhancement extending between the peroneal muscles, the soleus and lateral he
ad of gastrocnemius muscle. This edema and enhancement abuts the posterior aspect of the fibula. Bae
jorge, there are no findings to indicate osteomyelitis.

3. No drainable fluid collection or abscess is present.

 

 

 

 Elbert Holden MD on February 24, 2018 at 18:18           

Board Certified Radiologist.

 This report was verified electronically.

## 2018-02-24 NOTE — MB
cc:

LATRICE OLIVARES DPM

****

 

 

DATE OF CONSULTATION:  02/24/2018.

 

REASON FOR CONSULTATION:

Right distal lateral ankle ulcer, possible infection.

 

HISTORY OF PRESENT ILLNESS:

This is a pleasant 57-year-old female who has a history of a small isolated

minimally traumatic injury to the outside of the right ankle sometime within

the last four to six months.  It was not getting better in that it became

significantly worsened over the last week or so. The patient does have a

history of diabetes and hypertension.  She does not have a primary care doctor

in this area.  She is currently seen bedside.  She is having pain with pressure

to the area but no significant discomfort noted.

 

PAST MEDICAL HISTORY:

1. Hypertension.

2. Diabetes.

 

ALLERGIES:

AMLODIPINE.

 

INPATIENT MEDICATIONS:

1. Vancomycin.

2. Cefepime.

Please see the complete medication list in the chart.

 

PHYSICAL EXAMINATION

VITAL SIGNS:  Temperature 98.3, pulse rate 72, respiratory rate is 20, blood

pressure is 160/40, she is satting 96% on room air.

GENERAL:  This is an alert and oriented female exhibiting nonlabored

respirations.  She is verbally appropriate.

RIGHT LOWER EXTREMITY: The right lower extremity is examined.  There is noted

to be a mainly fibrotic wound measuring approximately 3 x 4 cm of the distal

lateral ankle. There is no bone involvement or bone exposed.  There are red

beefy granular edges at the central portion of the wound noted to be fibrotic.

There is no odor.  There is pain upon attempting to squeeze or palpate this

area. No purulence noted. The calf is soft.  The anterior tibial compartment

and lateral compartments appear to be soft.  The area immediately surrounding

the fibrotic ulcer is indurated and slightly warm.  Distal pulses are palpated.

Sensation is intact to light touch and deep pressure.  There is good range of

motion of the hindfoot and ankle.

 

LABORATORY STUDIES:

White blood cells 7.7, hemoglobin and hematocrit 13 and 39, platelet count is

446,000.

 

Erythrocyte sedimentation rate  is 13.

 

Chem-7:  Sodium is 137, potassium 3.3, chloride 103, carbon dioxide 25.8, BUN

15, creatinine 0.8, glucose is 139.

 

Wound culture is growing Staph aureus from the distal leg.

 

IMAGING STUDIES:

Tibia and fibula x-rays reported as normal.  MRI ordered and pending.

 

ASSESSMENT AND PLAN:

Right distal leg ulcer with focal versus deep infection.  MRI ordered.  This

possibly needs debridement versus aggressive wound care.  Continue IV

antibiotics. Surgery pending MRI.

 

 

 

 

                              _________________________________

                              RUBI Funez/LACY

D:  2/24/2018/3:39 PM

T:  2/24/2018/6:23 PM

Visit #:  [id number]

Job #:  [id number]

DIRK

## 2018-02-25 VITALS
RESPIRATION RATE: 20 BRPM | HEART RATE: 66 BPM | TEMPERATURE: 98 F | OXYGEN SATURATION: 99 % | DIASTOLIC BLOOD PRESSURE: 84 MMHG | SYSTOLIC BLOOD PRESSURE: 162 MMHG

## 2018-02-25 VITALS
TEMPERATURE: 98 F | HEART RATE: 52 BPM | SYSTOLIC BLOOD PRESSURE: 161 MMHG | DIASTOLIC BLOOD PRESSURE: 74 MMHG | RESPIRATION RATE: 18 BRPM | OXYGEN SATURATION: 99 %

## 2018-02-25 VITALS
OXYGEN SATURATION: 99 % | DIASTOLIC BLOOD PRESSURE: 69 MMHG | SYSTOLIC BLOOD PRESSURE: 144 MMHG | TEMPERATURE: 98.3 F | HEART RATE: 64 BPM | RESPIRATION RATE: 18 BRPM

## 2018-02-25 VITALS — HEART RATE: 74 BPM

## 2018-02-25 VITALS
DIASTOLIC BLOOD PRESSURE: 92 MMHG | OXYGEN SATURATION: 94 % | RESPIRATION RATE: 18 BRPM | HEART RATE: 69 BPM | TEMPERATURE: 98.4 F | SYSTOLIC BLOOD PRESSURE: 206 MMHG

## 2018-02-25 VITALS
DIASTOLIC BLOOD PRESSURE: 88 MMHG | OXYGEN SATURATION: 97 % | HEART RATE: 60 BPM | TEMPERATURE: 97.9 F | SYSTOLIC BLOOD PRESSURE: 175 MMHG | RESPIRATION RATE: 18 BRPM

## 2018-02-25 VITALS
TEMPERATURE: 98.2 F | RESPIRATION RATE: 18 BRPM | HEART RATE: 75 BPM | DIASTOLIC BLOOD PRESSURE: 84 MMHG | SYSTOLIC BLOOD PRESSURE: 175 MMHG | OXYGEN SATURATION: 97 %

## 2018-02-25 VITALS
SYSTOLIC BLOOD PRESSURE: 163 MMHG | OXYGEN SATURATION: 95 % | DIASTOLIC BLOOD PRESSURE: 77 MMHG | HEART RATE: 68 BPM | TEMPERATURE: 98.6 F | RESPIRATION RATE: 18 BRPM

## 2018-02-25 VITALS — HEART RATE: 61 BPM

## 2018-02-25 LAB
BUN SERPL-MCNC: 13 MG/DL (ref 7–18)
CALCIUM SERPL-MCNC: 8.8 MG/DL (ref 8.5–10.1)
CHLORIDE SERPL-SCNC: 105 MEQ/L (ref 98–107)
CREAT SERPL-MCNC: 0.78 MG/DL (ref 0.5–1)
GFR SERPLBLD BASED ON 1.73 SQ M-ARVRAT: 92 ML/MIN (ref 89–?)
GLUCOSE SERPL-MCNC: 84 MG/DL (ref 74–106)
HCO3 BLD-SCNC: 29.3 MEQ/L (ref 21–32)
SODIUM SERPL-SCNC: 141 MEQ/L (ref 136–145)

## 2018-02-25 PROCEDURE — 0JDQ0ZZ EXTRACTION OF RIGHT FOOT SUBCUTANEOUS TISSUE AND FASCIA, OPEN APPROACH: ICD-10-PCS | Performed by: PODIATRIST

## 2018-02-25 PROCEDURE — 0JBQ0ZZ EXCISION OF RIGHT FOOT SUBCUTANEOUS TISSUE AND FASCIA, OPEN APPROACH: ICD-10-PCS | Performed by: PODIATRIST

## 2018-02-25 PROCEDURE — 2W1QX6Z COMPRESSION OF RIGHT LOWER LEG USING PRESSURE DRESSING: ICD-10-PCS | Performed by: PODIATRIST

## 2018-02-25 RX ADMIN — GENTAMICIN SULFATE SCH APPLIC: 1 CREAM TOPICAL at 08:32

## 2018-02-25 RX ADMIN — STANDARDIZED SENNA CONCENTRATE AND DOCUSATE SODIUM SCH TAB: 8.6; 5 TABLET, FILM COATED ORAL at 21:00

## 2018-02-25 RX ADMIN — HYDROCHLOROTHIAZIDE SCH MG: 50 TABLET ORAL at 08:32

## 2018-02-25 RX ADMIN — INSULIN ASPART SCH: 100 INJECTION, SOLUTION INTRAVENOUS; SUBCUTANEOUS at 08:00

## 2018-02-25 RX ADMIN — ACYCLOVIR SCH UNITS: 800 TABLET ORAL at 21:12

## 2018-02-25 RX ADMIN — HEPARIN SODIUM SCH UNITS: 10000 INJECTION, SOLUTION INTRAVENOUS; SUBCUTANEOUS at 22:36

## 2018-02-25 RX ADMIN — ACETAMINOPHEN PRN MG: 325 TABLET ORAL at 14:06

## 2018-02-25 RX ADMIN — INSULIN ASPART SCH: 100 INJECTION, SOLUTION INTRAVENOUS; SUBCUTANEOUS at 17:00

## 2018-02-25 RX ADMIN — INSULIN ASPART SCH: 100 INJECTION, SOLUTION INTRAVENOUS; SUBCUTANEOUS at 12:37

## 2018-02-25 RX ADMIN — LEVOTHYROXINE SODIUM SCH MCG: 25 TABLET ORAL at 06:36

## 2018-02-25 RX ADMIN — ACETAMINOPHEN PRN MG: 325 TABLET ORAL at 00:58

## 2018-02-25 RX ADMIN — LEVOTHYROXINE SODIUM SCH MCG: 150 TABLET ORAL at 06:36

## 2018-02-25 RX ADMIN — SODIUM CHLORIDE SCH MLS/HR: 900 INJECTION INTRAVENOUS at 00:58

## 2018-02-25 RX ADMIN — Medication SCH ML: at 22:33

## 2018-02-25 RX ADMIN — SODIUM CHLORIDE SCH MLS/HR: 900 INJECTION INTRAVENOUS at 17:09

## 2018-02-25 RX ADMIN — DILTIAZEM HYDROCHLORIDE SCH MG: 60 TABLET, FILM COATED ORAL at 22:33

## 2018-02-25 RX ADMIN — CEFEPIME SCH MLS/HR: 1 INJECTION, POWDER, FOR SOLUTION INTRAMUSCULAR; INTRAVENOUS at 10:45

## 2018-02-25 RX ADMIN — HYDROCODONE BITARTRATE AND ACETAMINOPHEN PRN TAB: 5; 325 TABLET ORAL at 17:09

## 2018-02-25 RX ADMIN — STANDARDIZED SENNA CONCENTRATE AND DOCUSATE SODIUM SCH TAB: 8.6; 5 TABLET, FILM COATED ORAL at 08:31

## 2018-02-25 RX ADMIN — HYDROCODONE BITARTRATE AND ACETAMINOPHEN PRN TAB: 5; 325 TABLET ORAL at 22:50

## 2018-02-25 RX ADMIN — DILTIAZEM HYDROCHLORIDE SCH MG: 60 TABLET, FILM COATED ORAL at 13:54

## 2018-02-25 RX ADMIN — DILTIAZEM HYDROCHLORIDE SCH MG: 60 TABLET, FILM COATED ORAL at 08:31

## 2018-02-25 RX ADMIN — INSULIN ASPART SCH: 100 INJECTION, SOLUTION INTRAVENOUS; SUBCUTANEOUS at 21:12

## 2018-02-25 RX ADMIN — LISINOPRIL SCH MG: 5 TABLET ORAL at 22:36

## 2018-02-25 RX ADMIN — DILTIAZEM HYDROCHLORIDE SCH MG: 60 TABLET, FILM COATED ORAL at 17:09

## 2018-02-25 RX ADMIN — CEFEPIME SCH MLS/HR: 1 INJECTION, POWDER, FOR SOLUTION INTRAMUSCULAR; INTRAVENOUS at 22:37

## 2018-02-25 RX ADMIN — HEPARIN SODIUM SCH UNITS: 10000 INJECTION, SOLUTION INTRAVENOUS; SUBCUTANEOUS at 08:32

## 2018-02-25 RX ADMIN — Medication SCH ML: at 08:30

## 2018-02-25 RX ADMIN — LISINOPRIL SCH MG: 5 TABLET ORAL at 08:31

## 2018-02-25 NOTE — HHI.PR
Subjective


Remarks


Follow up leg wound. S/P incision & drainage with placement of wound vac. No 

complaints at this time. Pain is well controlled.





Objective


Vitals





Vital Signs








  Date Time  Temp Pulse Resp B/P (MAP) Pulse Ox O2 Delivery O2 Flow Rate FiO2


 


2/25/18 12:08 98.0 52 18 161/74 (103) 99   


 


2/25/18 10:50  55 20  98 Nasal Cannula 2 


 


2/25/18 10:45  55 22 145/74 (97) 98 Nasal Cannula 2 


 


2/25/18 10:30  57 16 126/66 (86) 98 Nasal Cannula 2 


 


2/25/18 10:24 98.0 58 14 131/68 (89) 97  2 


 


2/25/18 08:25  61      


 


2/25/18 08:08 98.0 66 20 162/84 (110) 99   


 


2/25/18 04:00 98.2 75 18 175/84 (114) 97   


 


2/25/18 00:00 98.3 64 18 144/69 (94) 99   


 


2/24/18 20:00 98.5 64 18 174/81 (112) 99   


 


2/24/18 17:41  66      


 


2/24/18 16:31 97.9 70 18 180/64 (102) 96   














I/O      


 


 2/24/18 2/24/18 2/24/18 2/25/18 2/25/18 2/25/18





 07:00 15:00 23:00 07:00 15:00 23:00


 


Intake Total  95 ml 360 ml  500 ml 


 


Output Total     5 ml 


 


Balance  95 ml 360 ml  495 ml 


 


      


 


Intake Oral   360 ml   


 


IV Total  95 ml    


 


Other     500 ml 


 


Output Estimated Blood Loss     5 ml 


 


# Voids  2  2 1 


 


# Bowel Movements    1  








Result Diagram:  


2/24/18 1623                                                                   

             2/25/18 0507





Imaging





Last Impressions








Ankle MRI 2/24/18 0000 Signed





Impressions: 





 Service Date/Time:  Saturday, February 24, 2018 17:34 - CONCLUSION:  1. There 

is 





 subcutaneous edema and enhancement along the lateral aspect of the distal leg 





 adjacent to the area of open wound characteristic of a cellulitis. 2. There is 





 subtle edema and enhancement of the superficial fibers of the peroneus longus 





 muscle and there is mild edema and enhancement extending between the peroneal 





 muscles, the soleus and lateral head of gastrocnemius muscle. This edema and 





 enhancement abuts the posterior aspect of the fibula. However, there are no 





 findings to indicate osteomyelitis. 3. No drainable fluid collection or 

abscess 





 is present.     Elbert Holden MD 


 


Tibia/Fibula X-Ray 2/23/18 0000 Signed





Impressions: 





 Service Date/Time:  Friday, February 23, 2018 19:30 - CONCLUSION:  1. No plain 





 radiograph evidence for osteomyelitis as questioned.     Marlo Montilla MD 








Objective Remarks


General: No acute distress.


Heart: Regular rate and rhythm. No murmur.


Lungs: Clear to auscultation bilaterally. No wheezes, rales, or rhonchi. 

Breathing is nonlabored.


Abdomen: Soft, nontender, nondistended.


Extremities: No lower extremity edema. Lateral right lower leg ulcer with wound 

vac in place.


Psych: Alert and oriented.


Procedures


2/25/18 I&D right ankle ulcer with wound VAC application


Urinary Catheter:  No


Vascular Central Line Catheter:  No





A/P


Problem List:  


(1) Diabetic foot infection


ICD Code:  E11.69 - Type 2 diabetes mellitus with other specified complication; 

L08.9 - Local infection of the skin and subcutaneous tissue, unspecified


(2) HTN (hypertension)


ICD Code:  I10 - Essential (primary) hypertension


(3) DM (diabetes mellitus)


ICD Code:  E11.9 - Type 2 diabetes mellitus without complications


Assessment and Plan


1.  Ulcerative lesion, right lower extremity: This is been present for 2-3 

months.  X-ray shows no sign of osteomyelitis.  Wound culture growing Staph 

aureus.  Continue IV antibiotics.  Appreciate podiatry recommendations. S/P I&

D. Wound vac to be changed in 1-2 days. 


2.  Hypertension: Continue diltiazem, HCTZ, lisinopril.


3.  Diabetes mellitus: Monitor Accu-Cheks and cover with sliding scale insulin.

  Continue Levemir, metformin.


4.  DVT prophylaxis: Heparin.


Discharge Planning


Transfer to med/surg.











Francisco Javier Castillo MD Feb 25, 2018 12:51

## 2018-02-25 NOTE — EKG
Date Performed: 02/25/2018       Time Performed: 05:49:06

 

PTAGE:      57 years

 

EKG:      SINUS BRADYCARDIA EARLY TRANSITION ST/T-WAVE ABNORMALITY, CONSIDER LATERAL ISCHEMIA ABNORMA
L ECG 

 

NO PREVIOUS TRACING            

 

DOCTOR:   Mykel Ulloa  Interpretating Date/Time  02/25/2018 09:13:46

## 2018-02-25 NOTE — PD.OP
__________________________________________________





Operative Report


Right ankle ulcer, infection


Postoperative Diagnosis:  


same


Procedure:


Right ankle incision drainage excisional debridement ulcer with application of 

wound VAC


Anesthesia:


Gen. with local


Surgeon:


Giovanni Waller


Assistant(s):


Scrub


Operation and Findings:


Estimated blood loss less than 10 mL's





Tourniquet none





Complications none





Disposition returned to floor continue wound VAC for 24-48 hours await deep 

culture before discharge





Justification for procedure: Worsening right ankle ulcer and infection in a 

known diabetic.  MRI did not show any osteomyelitis or deep abscess.  Plan for 

debridement wound VAC and control of infection primarily.  We will allow the 

wound to heal secondarily with wound VAC in preparation for possible skin graft 

at a later date.





Procedure in detail:





Under mild sedation the patient was brought in the operating room and placed on 

the operating table in the supine position.  Following the induction of general 

anesthesia the patient's right lower extremity was scrubbed prepped and draped 

in the usual aseptic fashion.  The right lower extremity was examined there is 

noted to be a mixed fibrotic serous purulent draining lateral ankle ulcer 

approximately 5-6 cm from the distal lateral malleolus.  Wound measurements 

were approximately 4 x 5 cm.  Sharp excisional debridement took place of ulcer 

utilizing 15 blade and forceps.  Superficial tissue was noted to have signs of 

microabscess incision was made deep there is no signs of involvement of the 

deep fascia of the muscle or lateral peroneal tendons. 6 O clock deep base 

tissue biopsy performed with also 12 O clock margin of skin and deep tissue.  

Utilizing the versa jet the wound was then debrided to viable bleeding tissue.  

Wound VAC was then applied under adequate seal and suction low continuous 100 

mmHg





Disposition:





Plan for wound VAC change in 1-2 days await final deep culture, orders for home 

wound VAC made and consult case management for Monday Wednesday Friday wound 

care.











Giovanni Waller DPM Feb 25, 2018 10:25

## 2018-02-26 VITALS
HEART RATE: 60 BPM | RESPIRATION RATE: 16 BRPM | OXYGEN SATURATION: 97 % | DIASTOLIC BLOOD PRESSURE: 77 MMHG | SYSTOLIC BLOOD PRESSURE: 172 MMHG | TEMPERATURE: 95.8 F

## 2018-02-26 VITALS
RESPIRATION RATE: 18 BRPM | DIASTOLIC BLOOD PRESSURE: 74 MMHG | OXYGEN SATURATION: 95 % | HEART RATE: 58 BPM | TEMPERATURE: 98.3 F | SYSTOLIC BLOOD PRESSURE: 154 MMHG

## 2018-02-26 VITALS
RESPIRATION RATE: 16 BRPM | HEART RATE: 61 BPM | OXYGEN SATURATION: 98 % | SYSTOLIC BLOOD PRESSURE: 165 MMHG | TEMPERATURE: 98 F | DIASTOLIC BLOOD PRESSURE: 81 MMHG

## 2018-02-26 VITALS
DIASTOLIC BLOOD PRESSURE: 73 MMHG | TEMPERATURE: 98.1 F | SYSTOLIC BLOOD PRESSURE: 157 MMHG | OXYGEN SATURATION: 93 % | HEART RATE: 57 BPM | RESPIRATION RATE: 16 BRPM

## 2018-02-26 VITALS — HEART RATE: 72 BPM

## 2018-02-26 VITALS
TEMPERATURE: 98.7 F | HEART RATE: 60 BPM | DIASTOLIC BLOOD PRESSURE: 76 MMHG | OXYGEN SATURATION: 97 % | SYSTOLIC BLOOD PRESSURE: 160 MMHG | RESPIRATION RATE: 16 BRPM

## 2018-02-26 VITALS — HEART RATE: 60 BPM

## 2018-02-26 VITALS — SYSTOLIC BLOOD PRESSURE: 157 MMHG | DIASTOLIC BLOOD PRESSURE: 78 MMHG

## 2018-02-26 RX ADMIN — DILTIAZEM HYDROCHLORIDE SCH MG: 60 TABLET, FILM COATED ORAL at 22:30

## 2018-02-26 RX ADMIN — Medication SCH ML: at 08:24

## 2018-02-26 RX ADMIN — INSULIN ASPART SCH: 100 INJECTION, SOLUTION INTRAVENOUS; SUBCUTANEOUS at 21:00

## 2018-02-26 RX ADMIN — SODIUM CHLORIDE SCH MLS/HR: 900 INJECTION INTRAVENOUS at 09:28

## 2018-02-26 RX ADMIN — LEVOTHYROXINE SODIUM SCH MCG: 150 TABLET ORAL at 05:32

## 2018-02-26 RX ADMIN — LISINOPRIL SCH MG: 5 TABLET ORAL at 08:23

## 2018-02-26 RX ADMIN — CEFEPIME SCH MLS/HR: 1 INJECTION, POWDER, FOR SOLUTION INTRAMUSCULAR; INTRAVENOUS at 08:24

## 2018-02-26 RX ADMIN — HYDROCODONE BITARTRATE AND ACETAMINOPHEN PRN TAB: 5; 325 TABLET ORAL at 05:33

## 2018-02-26 RX ADMIN — ENALAPRILAT PRN MG: 1.25 INJECTION INTRAVENOUS at 16:11

## 2018-02-26 RX ADMIN — INSULIN ASPART SCH: 100 INJECTION, SOLUTION INTRAVENOUS; SUBCUTANEOUS at 12:00

## 2018-02-26 RX ADMIN — INSULIN ASPART SCH: 100 INJECTION, SOLUTION INTRAVENOUS; SUBCUTANEOUS at 17:00

## 2018-02-26 RX ADMIN — DILTIAZEM HYDROCHLORIDE SCH MG: 60 TABLET, FILM COATED ORAL at 08:23

## 2018-02-26 RX ADMIN — LEVOTHYROXINE SODIUM SCH MCG: 25 TABLET ORAL at 05:32

## 2018-02-26 RX ADMIN — DILTIAZEM HYDROCHLORIDE SCH MG: 60 TABLET, FILM COATED ORAL at 17:13

## 2018-02-26 RX ADMIN — HEPARIN SODIUM SCH UNITS: 10000 INJECTION, SOLUTION INTRAVENOUS; SUBCUTANEOUS at 22:31

## 2018-02-26 RX ADMIN — STANDARDIZED SENNA CONCENTRATE AND DOCUSATE SODIUM SCH TAB: 8.6; 5 TABLET, FILM COATED ORAL at 08:23

## 2018-02-26 RX ADMIN — HYDROCHLOROTHIAZIDE SCH MG: 50 TABLET ORAL at 08:23

## 2018-02-26 RX ADMIN — INSULIN ASPART SCH: 100 INJECTION, SOLUTION INTRAVENOUS; SUBCUTANEOUS at 08:00

## 2018-02-26 RX ADMIN — LEVOFLOXACIN SCH MLS/HR: 5 INJECTION, SOLUTION INTRAVENOUS at 12:37

## 2018-02-26 RX ADMIN — IBUPROFEN PRN MG: 600 TABLET, FILM COATED ORAL at 17:19

## 2018-02-26 RX ADMIN — STANDARDIZED SENNA CONCENTRATE AND DOCUSATE SODIUM SCH TAB: 8.6; 5 TABLET, FILM COATED ORAL at 21:00

## 2018-02-26 RX ADMIN — ACYCLOVIR SCH UNITS: 800 TABLET ORAL at 22:31

## 2018-02-26 RX ADMIN — DILTIAZEM HYDROCHLORIDE SCH MG: 60 TABLET, FILM COATED ORAL at 12:37

## 2018-02-26 RX ADMIN — Medication SCH ML: at 22:30

## 2018-02-26 RX ADMIN — LISINOPRIL SCH MG: 5 TABLET ORAL at 22:30

## 2018-02-26 RX ADMIN — GENTAMICIN SULFATE SCH APPLIC: 1 CREAM TOPICAL at 08:23

## 2018-02-26 RX ADMIN — HEPARIN SODIUM SCH UNITS: 10000 INJECTION, SOLUTION INTRAVENOUS; SUBCUTANEOUS at 08:23

## 2018-02-26 NOTE — HHI.PR
Subjective


Remarks


Patient seen bedside.  Denies any nausea, vomiting, fevers, or chills.  Denies 

any pain to right lower extremity.  Denies any calf pain





Objective





Vital Signs








  Date Time  Temp Pulse Resp B/P (MAP) Pulse Ox O2 Delivery O2 Flow Rate FiO2


 


2/26/18 17:15    157/78 (104)    


 


2/26/18 15:45 95.8 60 16 172/77 (108) 97   


 


2/26/18 15:00  72      


 


2/26/18 11:22 98.7 60 16 160/76 (104) 97   


 


2/26/18 08:00  60      


 


2/26/18 07:42 98.3 58 18 154/74 (100) 95   





    Manual Cuff/Auscultation    


 


2/26/18 03:19 98.1 57 16 157/73 (101) 93   


 


2/25/18 23:24 98.4 69 18 206/92 (130) 94   


 


2/25/18 20:14 98.6 68 18 163/77 (105) 95   














I/O      


 


 2/25/18 2/25/18 2/25/18 2/26/18 2/26/18 2/26/18





 07:00 15:00 23:00 07:00 15:00 23:00


 


Intake Total  500 ml   500 ml 835 ml


 


Output Total  5 ml    


 


Balance  495 ml   500 ml 835 ml


 


      


 


Intake Oral      800 ml


 


IV Total     500 ml 35 ml


 


Other  500 ml    


 


Output Estimated Blood Loss  5 ml    


 


# Voids 2 1  1  


 


# Bowel Movements 1     








Result Diagram:  


2/24/18 1623                                                                   

             2/25/18 0507





Imaging





Last Impressions








Ankle MRI 2/24/18 0000 Signed





Impressions: 





 Service Date/Time:  Saturday, February 24, 2018 17:34 - CONCLUSION:  1. There 

is 





 subcutaneous edema and enhancement along the lateral aspect of the distal leg 





 adjacent to the area of open wound characteristic of a cellulitis. 2. There is 





 subtle edema and enhancement of the superficial fibers of the peroneus longus 





 muscle and there is mild edema and enhancement extending between the peroneal 





 muscles, the soleus and lateral head of gastrocnemius muscle. This edema and 





 enhancement abuts the posterior aspect of the fibula. However, there are no 





 findings to indicate osteomyelitis. 3. No drainable fluid collection or 

abscess 





 is present.     Elbert Holden MD 


 


Tibia/Fibula X-Ray 2/23/18 0000 Signed





Impressions: 





 Service Date/Time:  Friday, February 23, 2018 19:30 - CONCLUSION:  1. No plain 





 radiograph evidence for osteomyelitis as questioned.     Marlo Montilla MD 








Procedures


Status post right ankle debridement and irrigation


Other Results





Microbiology








 Date/Time


Source Procedure


Growth Status


 


 


 2/25/18 10:05


Fluid Other Fungal Smear - Final


NO FUNGAL ELEMENTS SEEN. Resulted


 


 2/25/18 10:05


Fluid Other Fungal Culture


Pending Resulted





 2/23/18 20:31


Wound Leg Gram Stain - Final Complete


 


 2/23/18 20:31 Wound Culture - Final


Staphylococcus Aureus Complete








Objective Remarks


Lower extremity physical exam:


Vascular: Dorsalis pedis 2/4, posterior tibial 2/4.  Capillary refill time 

within normal limits to digits 5 bilateral foot.  Edema present right foot and 

ankle nonpitting


Neuro: Gross sensation intact to bilateral lower extremity.  Pinpoint sensation 

decreased. No hyperalgesia noted to bilateral lower extremity


Dermatology: Normal temperature and turgor to bilateral lower extremity.  Wound 

VAC to right lateral ankle noted to be functioning at 125 mm per mercury with 

no leaks detected.


Musculoskeletal: No calf tenderness on palpation negative Homans sign.  Equal 

range of motion within normal limits.


Medications and IVs





Current Medications








 Medications


  (Trade)  Dose


 Ordered  Sig/Padmini


 Route  Start Time


 Stop Time Status Last Admin


 


  (D50w (Vial) Inj)  50 ml  UNSCH  PRN


 IV PUSH  2/23/18 22:00


     


 


 


  (Glucagon Inj)  1 mg  UNSCH  PRN


 OTHER  2/23/18 22:00


     


 


 


  (NovoLOG


 SUPPLEMENTAL


 SCALE)  1  ACHS SLIDING  SCALE


 SQ  2/24/18 08:00


    2/25/18 21:12


 


 


  (NS Flush)  2 ml  UNSCH  PRN


 IV FLUSH  2/23/18 22:00


     


 


 


  (NS Flush)  2 ml  BID


 IV FLUSH  2/24/18 09:00


    2/26/18 08:24


 


 


  (Zofran Inj)  4 mg  Q6H  PRN


 IVP  2/23/18 22:00


     


 


 


  (Heparin Inj)  5,000 units  Q12H


 SQ  2/24/18 09:00


    2/26/18 08:23


 


 


  (Tylenol)  650 mg  Q6H  PRN


 PO  2/23/18 22:00


    2/25/18 14:06


 


 


  (Morphine Inj)  2 mg  Q3H  PRN


 IV PUSH  2/23/18 22:00


     


 


 


  (Irma-Colace)  1 tab  BID


 PO  2/24/18 09:00


    2/26/18 08:23


 


 


  (Milk Of


 Magnesia Liq)  30 ml  Q12H  PRN


 PO  2/23/18 22:00


     


 


 


  (Senokot)  17.2 mg  Q12H  PRN


 PO  2/23/18 22:00


     


 


 


  (Dulcolax Supp)  10 mg  DAILY  PRN


 RECTAL  2/23/18 22:00


     


 


 


  (Lactulose Liq)  30 ml  DAILY  PRN


 PO  2/23/18 22:00


     


 


 


  (Cardizem)  60 mg  QID


 PO  2/24/18 09:00


    2/26/18 17:13


 


 


  (Hydrodiuril)  50 mg  DAILY


 PO  2/24/18 09:00


    2/26/18 08:23


 


 


  (Levemir Inj)  34 units  HS


 SQ  2/23/18 22:00


    2/25/18 21:12


 


 


  (Glucophage)  1,000 mg  BIDPC


 PO  2/24/18 09:00


    2/26/18 17:13


 


 


  (Prinivil)  5 mg  BID


 PO  2/24/18 09:00


    2/26/18 08:23


 


 


  (Synthroid)  150 mcg  DAILY@0600


 PO  2/24/18 06:00


    2/26/18 05:32


 


 


  (Synthroid)  25 mcg  DAILY@0600


 PO  2/24/18 06:00


    2/26/18 05:32


 


 


  (Gentamicin 0.1%


 Cream)  1 applic  DAILY


 TOPICAL  2/24/18 15:45


    2/26/18 08:23


 


 


 Lactated Ringer's  1,000 ml @ 


 30 mls/hr  Q24H PRN


 IV  2/25/18 03:00


 2/28/18 02:59   


 


 


  (Lopressor)  25 mg  ON CALL  PRN


 PO  2/25/18 03:00


 2/28/18 02:59   


 


 


  (Betadine 5%


 Antisepsis Kit)  1 applic  ON CALL  PRN


 EACH NARE  2/25/18 03:00


 2/28/18 02:59   


 


 


  (Chlorhexidine


 2% Cloth)  3 pack  ON CALL  PRN


 TOPICAL  2/25/18 03:00


 2/28/18 02:59   


 


 


  (Motrin)  600 mg  Q8H  PRN


 PO  2/26/18 11:30


    2/26/18 17:19


 


 


 Levofloxacin/


 Dextrose  150 ml @ 


 100 mls/hr  Q24H


 IV  2/26/18 13:00


    2/26/18 12:37


 


 


  (Vasotec Inj)  1.25 mg  Q6H  PRN


 IV PUSH  2/26/18 16:15


    2/26/18 16:11


 











Assessment and Plan


Assessment and Plan


57-year-old female status post right lateral ulcer debridement and irrigation, 

postop day 1 date of surgery 2/25





Patient evaluated and examined with all questions answered


Will take down wound VAC tomorrow and evaluate wound


Will await final deep cultures before recommending DC


Anticipate patient being discharged on wound VAC


OR pathology pending


Recommend ID consult for outpatient IV versus oral antibiotics depending on 

culture and pathology results











Gely Barahona DPM Feb 26, 2018 19:58

## 2018-02-26 NOTE — HHI.PR
Subjective


Remarks


Follow up leg wound. No complaints at this time. Tylenol is not working well 

for her pain and she does not want to take Norco. Requesting Ibuprofen.





Objective


Vitals





Vital Signs








  Date Time  Temp Pulse Resp B/P (MAP) Pulse Ox O2 Delivery O2 Flow Rate FiO2


 


2/26/18 11:22 98.7 60 16 160/76 (104) 97   


 


2/26/18 08:00  60      


 


2/26/18 07:42 98.3 58 18 154/74 (100) 95   





    Manual Cuff/Auscultation    


 


2/26/18 03:19 98.1 57 16 157/73 (101) 93   


 


2/25/18 23:24 98.4 69 18 206/92 (130) 94   


 


2/25/18 20:14 98.6 68 18 163/77 (105) 95   


 


2/25/18 16:06 97.9 60 18 175/88 (117) 97   


 


2/25/18 15:18  74      


 


2/25/18 12:08 98.0 52 18 161/74 (103) 99   














I/O      


 


 2/25/18 2/25/18 2/25/18 2/26/18 2/26/18 2/26/18





 07:00 15:00 23:00 07:00 15:00 23:00


 


Intake Total  500 ml   100 ml 


 


Output Total  5 ml    


 


Balance  495 ml   100 ml 


 


      


 


IV Total     100 ml 


 


Other  500 ml    


 


Output Estimated Blood Loss  5 ml    


 


# Voids 2 1  1  


 


# Bowel Movements 1     








Result Diagram:  


2/24/18 1623                                                                   

             2/25/18 0507





Imaging





Last Impressions








Ankle MRI 2/24/18 0000 Signed





Impressions: 





 Service Date/Time:  Saturday, February 24, 2018 17:34 - CONCLUSION:  1. There 

is 





 subcutaneous edema and enhancement along the lateral aspect of the distal leg 





 adjacent to the area of open wound characteristic of a cellulitis. 2. There is 





 subtle edema and enhancement of the superficial fibers of the peroneus longus 





 muscle and there is mild edema and enhancement extending between the peroneal 





 muscles, the soleus and lateral head of gastrocnemius muscle. This edema and 





 enhancement abuts the posterior aspect of the fibula. However, there are no 





 findings to indicate osteomyelitis. 3. No drainable fluid collection or 

abscess 





 is present.     Elbert Holden MD 


 


Tibia/Fibula X-Ray 2/23/18 0000 Signed





Impressions: 





 Service Date/Time:  Friday, February 23, 2018 19:30 - CONCLUSION:  1. No plain 





 radiograph evidence for osteomyelitis as questioned.     Marlo Montilla MD 








Objective Remarks


General: No acute distress.


Heart: Regular rate and rhythm. No murmur.


Lungs: Clear to auscultation bilaterally. No wheezes, rales, or rhonchi. 

Breathing is nonlabored.


Abdomen: Soft, nontender, nondistended.


Extremities: No lower extremity edema. Lateral right lower leg ulcer with wound 

vac in place.


Psych: Alert and oriented.


Procedures


2/25/18 I&D right ankle ulcer with wound VAC application


Urinary Catheter:  No


Vascular Central Line Catheter:  No





A/P


Problem List:  


(1) Diabetic foot infection


ICD Code:  E11.69 - Type 2 diabetes mellitus with other specified complication; 

L08.9 - Local infection of the skin and subcutaneous tissue, unspecified


(2) HTN (hypertension)


ICD Code:  I10 - Essential (primary) hypertension


(3) DM (diabetes mellitus)


ICD Code:  E11.9 - Type 2 diabetes mellitus without complications


Assessment and Plan


1.  Ulcerative lesion, right lower extremity: This is been present for 2-3 

months.  X-ray shows no sign of osteomyelitis.  Wound culture growing Staph 

aureus.  Adjust IV antibiotics.  Appreciate podiatry recommendations. S/P I&D. 

Wound vac to be changed in 1-2 days per podiatry. 


2.  Hypertension: Continue diltiazem, HCTZ, lisinopril.


3.  Diabetes mellitus: Monitor Accu-Cheks and cover with sliding scale insulin.

  Continue Levemir, metformin.


4.  DVT prophylaxis: Heparin.


Discharge Planning


Transfer to med/surg.











Francisco Javier Castillo MD Feb 26, 2018 11:31

## 2018-02-27 VITALS
SYSTOLIC BLOOD PRESSURE: 198 MMHG | TEMPERATURE: 97.7 F | OXYGEN SATURATION: 99 % | DIASTOLIC BLOOD PRESSURE: 93 MMHG | HEART RATE: 59 BPM | RESPIRATION RATE: 16 BRPM

## 2018-02-27 VITALS
RESPIRATION RATE: 16 BRPM | OXYGEN SATURATION: 96 % | SYSTOLIC BLOOD PRESSURE: 217 MMHG | DIASTOLIC BLOOD PRESSURE: 91 MMHG | HEART RATE: 71 BPM | TEMPERATURE: 97.7 F

## 2018-02-27 VITALS
OXYGEN SATURATION: 98 % | SYSTOLIC BLOOD PRESSURE: 189 MMHG | DIASTOLIC BLOOD PRESSURE: 91 MMHG | RESPIRATION RATE: 16 BRPM | TEMPERATURE: 97.8 F | HEART RATE: 70 BPM

## 2018-02-27 VITALS
SYSTOLIC BLOOD PRESSURE: 145 MMHG | RESPIRATION RATE: 16 BRPM | DIASTOLIC BLOOD PRESSURE: 69 MMHG | TEMPERATURE: 98 F | HEART RATE: 61 BPM | OXYGEN SATURATION: 98 %

## 2018-02-27 VITALS — SYSTOLIC BLOOD PRESSURE: 197 MMHG | DIASTOLIC BLOOD PRESSURE: 91 MMHG

## 2018-02-27 VITALS
SYSTOLIC BLOOD PRESSURE: 199 MMHG | OXYGEN SATURATION: 98 % | TEMPERATURE: 98.2 F | HEART RATE: 65 BPM | DIASTOLIC BLOOD PRESSURE: 88 MMHG | RESPIRATION RATE: 18 BRPM

## 2018-02-27 VITALS — HEART RATE: 63 BPM

## 2018-02-27 VITALS
RESPIRATION RATE: 16 BRPM | DIASTOLIC BLOOD PRESSURE: 89 MMHG | SYSTOLIC BLOOD PRESSURE: 185 MMHG | HEART RATE: 64 BPM | OXYGEN SATURATION: 98 %

## 2018-02-27 VITALS — DIASTOLIC BLOOD PRESSURE: 86 MMHG | SYSTOLIC BLOOD PRESSURE: 182 MMHG

## 2018-02-27 VITALS
SYSTOLIC BLOOD PRESSURE: 203 MMHG | TEMPERATURE: 97.8 F | HEART RATE: 62 BPM | DIASTOLIC BLOOD PRESSURE: 93 MMHG | RESPIRATION RATE: 16 BRPM | OXYGEN SATURATION: 98 %

## 2018-02-27 VITALS — HEART RATE: 64 BPM

## 2018-02-27 VITALS
TEMPERATURE: 97.4 F | RESPIRATION RATE: 18 BRPM | SYSTOLIC BLOOD PRESSURE: 192 MMHG | HEART RATE: 65 BPM | DIASTOLIC BLOOD PRESSURE: 84 MMHG | OXYGEN SATURATION: 99 %

## 2018-02-27 VITALS — HEART RATE: 65 BPM

## 2018-02-27 LAB
BUN SERPL-MCNC: 14 MG/DL (ref 7–18)
CALCIUM SERPL-MCNC: 9.2 MG/DL (ref 8.5–10.1)
CHLORIDE SERPL-SCNC: 104 MEQ/L (ref 98–107)
CREAT SERPL-MCNC: 0.8 MG/DL (ref 0.5–1)
GFR SERPLBLD BASED ON 1.73 SQ M-ARVRAT: 89 ML/MIN (ref 89–?)
GLUCOSE SERPL-MCNC: 89 MG/DL (ref 74–106)
HCO3 BLD-SCNC: 28.8 MEQ/L (ref 21–32)
SODIUM SERPL-SCNC: 139 MEQ/L (ref 136–145)

## 2018-02-27 RX ADMIN — ENALAPRILAT PRN MG: 1.25 INJECTION INTRAVENOUS at 05:22

## 2018-02-27 RX ADMIN — IBUPROFEN PRN MG: 600 TABLET, FILM COATED ORAL at 03:33

## 2018-02-27 RX ADMIN — DILTIAZEM HYDROCHLORIDE SCH MG: 60 TABLET, FILM COATED ORAL at 09:01

## 2018-02-27 RX ADMIN — STANDARDIZED SENNA CONCENTRATE AND DOCUSATE SODIUM SCH TAB: 8.6; 5 TABLET, FILM COATED ORAL at 09:01

## 2018-02-27 RX ADMIN — ENALAPRILAT PRN MG: 1.25 INJECTION INTRAVENOUS at 12:37

## 2018-02-27 RX ADMIN — INSULIN ASPART SCH: 100 INJECTION, SOLUTION INTRAVENOUS; SUBCUTANEOUS at 08:00

## 2018-02-27 RX ADMIN — LEVOTHYROXINE SODIUM SCH MCG: 25 TABLET ORAL at 05:22

## 2018-02-27 RX ADMIN — INSULIN ASPART SCH: 100 INJECTION, SOLUTION INTRAVENOUS; SUBCUTANEOUS at 16:16

## 2018-02-27 RX ADMIN — DILTIAZEM HYDROCHLORIDE SCH MG: 60 TABLET, FILM COATED ORAL at 12:36

## 2018-02-27 RX ADMIN — DILTIAZEM HYDROCHLORIDE SCH MG: 60 TABLET, FILM COATED ORAL at 20:39

## 2018-02-27 RX ADMIN — Medication SCH ML: at 20:45

## 2018-02-27 RX ADMIN — DILTIAZEM HYDROCHLORIDE SCH MG: 60 TABLET, FILM COATED ORAL at 17:39

## 2018-02-27 RX ADMIN — STANDARDIZED SENNA CONCENTRATE AND DOCUSATE SODIUM SCH TAB: 8.6; 5 TABLET, FILM COATED ORAL at 20:39

## 2018-02-27 RX ADMIN — ACETAMINOPHEN AND CODEINE PHOSPHATE PRN TAB: 300; 30 TABLET ORAL at 22:15

## 2018-02-27 RX ADMIN — ACYCLOVIR SCH UNITS: 800 TABLET ORAL at 20:42

## 2018-02-27 RX ADMIN — HEPARIN SODIUM SCH UNITS: 10000 INJECTION, SOLUTION INTRAVENOUS; SUBCUTANEOUS at 09:01

## 2018-02-27 RX ADMIN — GENTAMICIN SULFATE SCH APPLIC: 1 CREAM TOPICAL at 09:01

## 2018-02-27 RX ADMIN — LEVOTHYROXINE SODIUM SCH MCG: 150 TABLET ORAL at 05:22

## 2018-02-27 RX ADMIN — IBUPROFEN PRN MG: 600 TABLET, FILM COATED ORAL at 20:40

## 2018-02-27 RX ADMIN — HEPARIN SODIUM SCH UNITS: 10000 INJECTION, SOLUTION INTRAVENOUS; SUBCUTANEOUS at 20:39

## 2018-02-27 RX ADMIN — HYDROCHLOROTHIAZIDE SCH MG: 50 TABLET ORAL at 09:01

## 2018-02-27 RX ADMIN — LISINOPRIL SCH MG: 5 TABLET ORAL at 20:39

## 2018-02-27 RX ADMIN — LEVOFLOXACIN SCH MLS/HR: 5 INJECTION, SOLUTION INTRAVENOUS at 12:37

## 2018-02-27 RX ADMIN — LISINOPRIL SCH MG: 5 TABLET ORAL at 09:00

## 2018-02-27 RX ADMIN — Medication SCH ML: at 09:00

## 2018-02-27 RX ADMIN — INSULIN ASPART SCH: 100 INJECTION, SOLUTION INTRAVENOUS; SUBCUTANEOUS at 12:00

## 2018-02-27 RX ADMIN — INSULIN ASPART SCH: 100 INJECTION, SOLUTION INTRAVENOUS; SUBCUTANEOUS at 20:41

## 2018-02-27 NOTE — HHI.PR
Subjective


Remarks


Patient inquiring about when she will be able to go home.  Hoping she will not 

need the wound VAC.





Objective


Vitals





Vital Signs








  Date Time  Temp Pulse Resp B/P (MAP) Pulse Ox O2 Delivery O2 Flow Rate FiO2


 


2/27/18 13:18    197/91 (126)    


 


2/27/18 11:24 97.7 71 16 217/91 (133) 96   


 


2/27/18 08:38 97.8 70 16 189/91 (123) 98   


 


2/27/18 06:33  64 16 185/89 (121) 98   


 


2/27/18 04:59 97.8 62 16 203/93 (129) 98   


 


2/27/18 02:06  65      


 


2/27/18 01:43 98.0 61 16 145/69 (94) 98   


 


2/26/18 20:20 98.0 61 16 165/81 (109) 98   


 


2/26/18 17:15    157/78 (104)    


 


2/26/18 15:45 95.8 60 16 172/77 (108) 97   


 


2/26/18 15:00  72      














I/O      


 


 2/26/18 2/26/18 2/26/18 2/27/18 2/27/18 2/27/18





 07:00 15:00 23:00 07:00 15:00 23:00


 


Intake Total  500 ml 835 ml 800 ml  


 


Balance  500 ml 835 ml 800 ml  


 


      


 


Intake Oral   800 ml 800 ml  


 


IV Total  500 ml 35 ml   


 


# Voids 1   3  








Result Diagram:  


2/24/18 1623                                                                   

             2/27/18 0540





Objective Remarks


GENERAL: This is a well-nourished, well-developed patient, in no apparent 

distress.


CARDIOVASCULAR: Normal rate and regular rhythm without murmurs, gallops, or 

rubs. 


RESPIRATORY: Good respiratory efforts. Breath sounds equal and clear to 

auscultation bilaterally.


GASTROINTESTINAL: Abdomen soft, non-tender, non-distended. Normal active bowel 

sounds


MUSCULOSKELETAL: Posterior lateral right leg with an ulceration.  Wound VAC in 

place.  Wound appears clean. 


NEURO:  Alert & Oriented x4 to person, place, time, situation.  Moves all ext x4


PSYCH: Appropriate mood and affect.


Procedures


2/25/18 I&D right ankle ulcer with wound VAC application





A/P


Problem List:  


(1) Diabetic foot infection


ICD Code:  E11.69 - Type 2 diabetes mellitus with other specified complication; 

L08.9 - Local infection of the skin and subcutaneous tissue, unspecified


(2) HTN (hypertension)


ICD Code:  I10 - Essential (primary) hypertension


(3) DM (diabetes mellitus)


ICD Code:  E11.9 - Type 2 diabetes mellitus without complications


Assessment and Plan


57-year-old female admitted with:





1.  Ulcerative lesion, right lower extremity: This is been present for 2-3 

months.  Patient admits she has not been compliant with getting appropriate 

wound care.  X-ray shows no sign of osteomyelitis.  Wound culture grew MSSA.  

Sensitive to Levaquin which the patient has been on.  Appreciate podiatry 

recommendations. S/P I&D. Wound vac to be changed in 1-2 days per podiatry.  

Case management following.  Wound VAC will be delivered today and outpatient 

wound care to be arranged.


- Plan to transition the patient to oral Levaquin on discharge. Expect the 

patient to continue to improve with appropriate wound care. Discussed the need 

to be compliant with the patient.  She agreed to follow-up as instructed.


2.  Hypertension: Continue diltiazem, HCTZ, lisinopril.


3.  Diabetes mellitus: Monitor Accu-Cheks and cover with sliding scale insulin.

  Continue Levemir, metformin.


4.  DVT prophylaxis: Heparin.











Randell Nice MD Feb 27, 2018 13:24

## 2018-02-27 NOTE — HHI.PR
Subjective


Remarks


Patient seen bedside.  Denies any nausea, vomiting, fevers, or chills.  Denies 

any pain to right lower extremity.  Denies any calf pain.  Per patient the 

sharp pain pains to right lateral ankle are secondary to wound VAC.





Objective





Vital Signs








  Date Time  Temp Pulse Resp B/P (MAP) Pulse Ox O2 Delivery O2 Flow Rate FiO2


 


2/27/18 20:33 97.4 65 18 192/84 (120) 99   


 


2/27/18 17:49 98.2 65 18 199/88 (125) 98   


 


2/27/18 16:16    182/86 (118)    


 


2/27/18 15:00  64      


 


2/27/18 14:19 97.7 59 16 198/93 (128) 99   


 


2/27/18 13:18    197/91 (126)    


 


2/27/18 11:24 97.7 71 16 217/91 (133) 96   


 


2/27/18 08:38 97.8 70 16 189/91 (123) 98   


 


2/27/18 08:00  63      


 


2/27/18 06:33  64 16 185/89 (121) 98   


 


2/27/18 04:59 97.8 62 16 203/93 (129) 98   


 


2/27/18 02:06  65      


 


2/27/18 01:43 98.0 61 16 145/69 (94) 98   














I/O      


 


 2/26/18 2/26/18 2/26/18 2/27/18 2/27/18 2/27/18





 07:00 15:00 23:00 07:00 15:00 23:00


 


Intake Total  500 ml 835 ml 800 ml 150 ml 1000 ml


 


Output Total      900 ml


 


Balance  500 ml 835 ml 800 ml 150 ml 100 ml


 


      


 


Intake Oral   800 ml 800 ml  750 ml


 


IV Total  500 ml 35 ml  150 ml 250 ml


 


Output Urine Total      900 ml


 


# Voids 1   3  








Result Diagram:  


2/24/18 1623                                                                   

             2/27/18 0540





Imaging





Last Impressions








Ankle MRI 2/24/18 0000 Signed





Impressions: 





 Service Date/Time:  Saturday, February 24, 2018 17:34 - CONCLUSION:  1. There 

is 





 subcutaneous edema and enhancement along the lateral aspect of the distal leg 





 adjacent to the area of open wound characteristic of a cellulitis. 2. There is 





 subtle edema and enhancement of the superficial fibers of the peroneus longus 





 muscle and there is mild edema and enhancement extending between the peroneal 





 muscles, the soleus and lateral head of gastrocnemius muscle. This edema and 





 enhancement abuts the posterior aspect of the fibula. However, there are no 





 findings to indicate osteomyelitis. 3. No drainable fluid collection or 

abscess 





 is present.     Elbert Holden MD 


 


Tibia/Fibula X-Ray 2/23/18 0000 Signed





Impressions: 





 Service Date/Time:  Friday, February 23, 2018 19:30 - CONCLUSION:  1. No plain 





 radiograph evidence for osteomyelitis as questioned.     Marlo Montilla MD 








Procedures


Status post right ankle debridement and irrigation/vision and drainage with 

wound VAC placement


Other Results





Microbiology








 Date/Time


Source Procedure


Growth Status


 


 


 2/25/18 10:05


Fluid Other Fungal Smear - Final


NO FUNGAL ELEMENTS SEEN. Resulted


 


 2/25/18 10:05


Fluid Other Fungal Culture


Pending Resulted





 2/23/18 20:31


Wound Leg Gram Stain - Final Complete


 


 2/23/18 20:31 Wound Culture - Final


Staphylococcus Aureus Complete








Objective Remarks


Lower extremity physical exam:


Vascular: Dorsalis pedis 2/4, posterior tibial 2/4.  Capillary refill time 

within normal limits to digits 5 bilateral foot.  Edema present right foot and 

ankle nonpitting


Neuro: Gross sensation intact to bilateral lower extremity.  Pinpoint sensation 

decreased. No hyperalgesia noted to bilateral lower extremity


Dermatology: Normal temperature and turgor to bilateral lower extremity.  Wound 

noted to right lateral ankle proximal to lateral malleolus measuring 

approximately 5 cm x 6 cm x 0.3 depth with fibro-granular base 

epithelialization noted to wound edges and healthy granular tissue noted.  No 

purulent drainage noted.  No periwound erythema noted.


Musculoskeletal: No calf tenderness on palpation negative Homans sign.  Ankle 

range of motion within normal limits.


Medications and IVs





Current Medications








 Medications


  (Trade)  Dose


 Ordered  Sig/Padmini


 Route  Start Time


 Stop Time Status Last Admin


 


  (D50w (Vial) Inj)  50 ml  UNSCH  PRN


 IV PUSH  2/23/18 22:00


     


 


 


  (Glucagon Inj)  1 mg  UNSCH  PRN


 OTHER  2/23/18 22:00


     


 


 


  (NovoLOG


 SUPPLEMENTAL


 SCALE)  1  ACHS SLIDING  SCALE


 SQ  2/24/18 08:00


    2/25/18 21:12


 


 


  (NS Flush)  2 ml  UNSCH  PRN


 IV FLUSH  2/23/18 22:00


     


 


 


  (NS Flush)  2 ml  BID


 IV FLUSH  2/24/18 09:00


    2/27/18 20:45


 


 


  (Zofran Inj)  4 mg  Q6H  PRN


 IVP  2/23/18 22:00


     


 


 


  (Heparin Inj)  5,000 units  Q12H


 SQ  2/24/18 09:00


    2/27/18 20:39


 


 


  (Tylenol)  650 mg  Q6H  PRN


 PO  2/23/18 22:00


    2/25/18 14:06


 


 


  (Morphine Inj)  2 mg  Q3H  PRN


 IV PUSH  2/23/18 22:00


     


 


 


  (Irma-Colace)  1 tab  BID


 PO  2/24/18 09:00


    2/27/18 20:39


 


 


  (Milk Of


 Magnesia Liq)  30 ml  Q12H  PRN


 PO  2/23/18 22:00


     


 


 


  (Senokot)  17.2 mg  Q12H  PRN


 PO  2/23/18 22:00


     


 


 


  (Dulcolax Supp)  10 mg  DAILY  PRN


 RECTAL  2/23/18 22:00


     


 


 


  (Lactulose Liq)  30 ml  DAILY  PRN


 PO  2/23/18 22:00


     


 


 


  (Cardizem)  60 mg  QID


 PO  2/24/18 09:00


    2/27/18 20:39


 


 


  (Hydrodiuril)  50 mg  DAILY


 PO  2/24/18 09:00


    2/27/18 09:01


 


 


  (Levemir Inj)  34 units  HS


 SQ  2/23/18 22:00


    2/27/18 20:42


 


 


  (Glucophage)  1,000 mg  BIDPC


 PO  2/24/18 09:00


    2/27/18 17:39


 


 


  (Synthroid)  150 mcg  DAILY@0600


 PO  2/24/18 06:00


    2/27/18 05:22


 


 


  (Synthroid)  25 mcg  DAILY@0600


 PO  2/24/18 06:00


    2/27/18 05:22


 


 


  (Gentamicin 0.1%


 Cream)  1 applic  DAILY


 TOPICAL  2/24/18 15:45


    2/27/18 09:01


 


 


 Lactated Ringer's  1,000 ml @ 


 30 mls/hr  Q24H PRN


 IV  2/25/18 03:00


 2/28/18 02:59   


 


 


  (Lopressor)  25 mg  ON CALL  PRN


 PO  2/25/18 03:00


 2/28/18 02:59   


 


 


  (Betadine 5%


 Antisepsis Kit)  1 applic  ON CALL  PRN


 EACH NARE  2/25/18 03:00


 2/28/18 02:59   


 


 


  (Chlorhexidine


 2% Cloth)  3 pack  ON CALL  PRN


 TOPICAL  2/25/18 03:00


 2/28/18 02:59   


 


 


  (Motrin)  600 mg  Q8H  PRN


 PO  2/26/18 11:30


    2/27/18 20:40


 


 


 Levofloxacin/


 Dextrose  150 ml @ 


 100 mls/hr  Q24H


 IV  2/26/18 13:00


    2/27/18 12:37


 


 


  (Vasotec Inj)  1.25 mg  Q6H  PRN


 IV PUSH  2/26/18 16:15


    2/27/18 12:37


 


 


  (Prinivil)  10 mg  BID


 PO  2/27/18 09:00


    2/27/18 20:39


 











Assessment and Plan


Assessment and Plan


57-year-old female status post right lateral ulcer debridement and irrigation, 

postop day 1 date of surgery 2/25





Patient evaluated and examined with all questions answered


Wound evaluated with nurse bedside


Okay to DC per podiatry with appropriate antibiotic recommendations by 

infectious disease


Patient to go home with wound VAC will need home health care


Patient to follow-up with Dr. Jones within 1 week of discharge 


Podiatry discharge orders entered











Gely Barahona DPM Feb 27, 2018 21:17

## 2018-02-28 VITALS
OXYGEN SATURATION: 95 % | HEART RATE: 64 BPM | SYSTOLIC BLOOD PRESSURE: 163 MMHG | DIASTOLIC BLOOD PRESSURE: 64 MMHG | RESPIRATION RATE: 18 BRPM | TEMPERATURE: 97.3 F

## 2018-02-28 VITALS
DIASTOLIC BLOOD PRESSURE: 108 MMHG | RESPIRATION RATE: 20 BRPM | OXYGEN SATURATION: 96 % | HEART RATE: 82 BPM | TEMPERATURE: 97.5 F | SYSTOLIC BLOOD PRESSURE: 245 MMHG

## 2018-02-28 VITALS — HEART RATE: 89 BPM

## 2018-02-28 VITALS
TEMPERATURE: 98.6 F | HEART RATE: 65 BPM | DIASTOLIC BLOOD PRESSURE: 79 MMHG | RESPIRATION RATE: 20 BRPM | SYSTOLIC BLOOD PRESSURE: 180 MMHG | OXYGEN SATURATION: 96 %

## 2018-02-28 VITALS
TEMPERATURE: 97 F | OXYGEN SATURATION: 99 % | RESPIRATION RATE: 18 BRPM | HEART RATE: 67 BPM | DIASTOLIC BLOOD PRESSURE: 75 MMHG | SYSTOLIC BLOOD PRESSURE: 178 MMHG

## 2018-02-28 VITALS
DIASTOLIC BLOOD PRESSURE: 80 MMHG | SYSTOLIC BLOOD PRESSURE: 188 MMHG | RESPIRATION RATE: 20 BRPM | OXYGEN SATURATION: 97 % | TEMPERATURE: 98.3 F | HEART RATE: 61 BPM

## 2018-02-28 VITALS
HEART RATE: 54 BPM | TEMPERATURE: 98.2 F | OXYGEN SATURATION: 96 % | RESPIRATION RATE: 18 BRPM | DIASTOLIC BLOOD PRESSURE: 72 MMHG | SYSTOLIC BLOOD PRESSURE: 159 MMHG

## 2018-02-28 VITALS — SYSTOLIC BLOOD PRESSURE: 179 MMHG | DIASTOLIC BLOOD PRESSURE: 81 MMHG

## 2018-02-28 VITALS — HEART RATE: 65 BPM

## 2018-02-28 VITALS — HEART RATE: 60 BPM

## 2018-02-28 VITALS — HEART RATE: 54 BPM

## 2018-02-28 VITALS — HEART RATE: 57 BPM

## 2018-02-28 LAB
BUN SERPL-MCNC: 15 MG/DL (ref 7–18)
CALCIUM SERPL-MCNC: 9.5 MG/DL (ref 8.5–10.1)
CHLORIDE SERPL-SCNC: 102 MEQ/L (ref 98–107)
CREAT SERPL-MCNC: 0.87 MG/DL (ref 0.5–1)
GFR SERPLBLD BASED ON 1.73 SQ M-ARVRAT: 81 ML/MIN (ref 89–?)
GLUCOSE SERPL-MCNC: 102 MG/DL (ref 74–106)
HCO3 BLD-SCNC: 29.4 MEQ/L (ref 21–32)
SODIUM SERPL-SCNC: 138 MEQ/L (ref 136–145)
TROPONIN I SERPL-MCNC: (no result) NG/ML (ref 0.02–0.05)

## 2018-02-28 RX ADMIN — IBUPROFEN PRN MG: 600 TABLET, FILM COATED ORAL at 15:39

## 2018-02-28 RX ADMIN — LEVOFLOXACIN SCH MLS/HR: 5 INJECTION, SOLUTION INTRAVENOUS at 12:10

## 2018-02-28 RX ADMIN — LEVOTHYROXINE SODIUM SCH MCG: 150 TABLET ORAL at 06:11

## 2018-02-28 RX ADMIN — LISINOPRIL SCH MG: 5 TABLET ORAL at 08:23

## 2018-02-28 RX ADMIN — HEPARIN SODIUM SCH UNITS: 10000 INJECTION, SOLUTION INTRAVENOUS; SUBCUTANEOUS at 08:25

## 2018-02-28 RX ADMIN — Medication SCH ML: at 20:45

## 2018-02-28 RX ADMIN — LISINOPRIL SCH MG: 20 TABLET ORAL at 20:45

## 2018-02-28 RX ADMIN — ACETAMINOPHEN AND CODEINE PHOSPHATE PRN TAB: 300; 30 TABLET ORAL at 20:55

## 2018-02-28 RX ADMIN — STANDARDIZED SENNA CONCENTRATE AND DOCUSATE SODIUM SCH TAB: 8.6; 5 TABLET, FILM COATED ORAL at 20:45

## 2018-02-28 RX ADMIN — DILTIAZEM HYDROCHLORIDE SCH MG: 30 TABLET, FILM COATED ORAL at 20:43

## 2018-02-28 RX ADMIN — STANDARDIZED SENNA CONCENTRATE AND DOCUSATE SODIUM SCH TAB: 8.6; 5 TABLET, FILM COATED ORAL at 08:22

## 2018-02-28 RX ADMIN — ACYCLOVIR SCH UNITS: 800 TABLET ORAL at 20:46

## 2018-02-28 RX ADMIN — INSULIN ASPART SCH: 100 INJECTION, SOLUTION INTRAVENOUS; SUBCUTANEOUS at 11:16

## 2018-02-28 RX ADMIN — LEVOTHYROXINE SODIUM SCH MCG: 25 TABLET ORAL at 06:11

## 2018-02-28 RX ADMIN — DILTIAZEM HYDROCHLORIDE SCH MG: 60 TABLET, FILM COATED ORAL at 08:22

## 2018-02-28 RX ADMIN — IBUPROFEN PRN MG: 600 TABLET, FILM COATED ORAL at 23:51

## 2018-02-28 RX ADMIN — HYDROCHLOROTHIAZIDE SCH MG: 50 TABLET ORAL at 08:21

## 2018-02-28 RX ADMIN — ENALAPRILAT PRN MG: 1.25 INJECTION INTRAVENOUS at 12:07

## 2018-02-28 RX ADMIN — INSULIN ASPART SCH: 100 INJECTION, SOLUTION INTRAVENOUS; SUBCUTANEOUS at 07:44

## 2018-02-28 RX ADMIN — INSULIN ASPART SCH: 100 INJECTION, SOLUTION INTRAVENOUS; SUBCUTANEOUS at 20:46

## 2018-02-28 RX ADMIN — HEPARIN SODIUM SCH UNITS: 10000 INJECTION, SOLUTION INTRAVENOUS; SUBCUTANEOUS at 20:45

## 2018-02-28 RX ADMIN — INSULIN ASPART SCH: 100 INJECTION, SOLUTION INTRAVENOUS; SUBCUTANEOUS at 16:31

## 2018-02-28 RX ADMIN — Medication SCH ML: at 08:26

## 2018-02-28 RX ADMIN — DILTIAZEM HYDROCHLORIDE SCH MG: 30 TABLET, FILM COATED ORAL at 14:50

## 2018-02-28 RX ADMIN — GENTAMICIN SULFATE SCH APPLIC: 1 CREAM TOPICAL at 08:26

## 2018-02-28 NOTE — HHI.FF
__________________________________________________





Infusion Therapy


Location of Infusion Therapy:  [facility] IV Infusion Order


Patient Information


Patient Weight


121.2 kg


Diagnosis:  


Coded Allergies:  


     amlodipine (Verified  Allergy, Unknown, 2/23/18)





Administer Medication


Ceftriaxone


2 grams IV


q 24 hours


Start Treatment:  Feb 28, 2018


Stop Treatment:  Mar 13, 2018





Additional Information


Venous access:  PICC Line


Additional Instructions





[x] Peripheral flush and dressing changes per protocol


[x] Implanted port and central :


* Implanted port: 10 ml Normal Saline followed by 5 ml Heparin 100 units/ml 

Heparin flush


   after each use and monthly to maintain.


   [] May leave port accessed during therapy.


   [] May leave peripheral site accessed for duration of therapy.





[x] If patient has SOB or respiratory distress, check oxygen saturation. If 

less than 90% or clinical signs of respiratory distress, administer oxygen at 2 

L/min. via nasal cannula and notify physician.





[x] Anaphylaxis/Reaction orders:


* Stop infusion.


* Keep IV line open with saline flush.


* Notify physician.


* Monitor vital signs every 15 minutes until symptoms resolve.


* Check Oxygen saturation; Oxygen at 2 L/min. via nasal cannula if less than 90%

 or clinical signs of respiratory distress.


* Administer diphenhydramine (Benadryl) 25 mg IV STAT, (unless patient has 

received as pre-med). May repeat once, if necessary.


* Solu-Cortef 250 mg IVP over 30-60 seconds, use 100 mg vials for each 

dissolution.


* Epinephrine (1mg/1 ml) 0.3 mg subcutaneously or IVP now with any signs of 

respiratory distress.


* Check with physician for new additional pre-med orders if patient is re-

challenged or re-treated.


[x] May remove PICC line when treatment complete, after confirming with 

Physician.


[x] If the patient is admitted to the hospital, the ED, or transferred via [facility]

, complete transfer form including medication reconciliation order sheet.





Laboratory Tests


Weekly Labs:  CBC w/diff, Creatinine, LFT's (Hepatic function test)











Narcisa Stoner MD Feb 28, 2018 18:59

## 2018-02-28 NOTE — RADRPT
EXAM DATE/TIME:  02/28/2018 08:46 

 

[facility] COMPARISON:     

No previous studies available for comparison.

 

                     

INDICATIONS :     

Chest pressure since last night.

                     

 

MEDICAL HISTORY :            

Hypertension. Diabetes mellitus type 2.   

 

SURGICAL HISTORY :     

None.   

 

ENCOUNTER:     

Initial                                        

 

ACUITY:     

2 days      

 

PAIN SCORE:     

2/10

 

LOCATION:     

Bilateral chest 

 

FINDINGS:     

Portable AP view of the chest demonstrates a normal-sized cardiac silhouette. No effusion, consolidat
ion, or pneumothorax is visualized. The bones and soft tissues demonstrate no acute abnormality.

 

CONCLUSION:     

No acute cardiopulmonary abnormality is identified.

 

 

 

 Elbert Holden MD on February 28, 2018 at 9:07           

Board Certified Radiologist.

 This report was verified electronically.

## 2018-02-28 NOTE — PD.ID.CON
History of Present Illness


Service


ID


Consult Requested By


Dr Newberry


Reason for Consult


R ankle MSSA infx


Primary Care Physician


No Primary Care Physician


Diagnoses:  


History of Present Illness


58 yo diabetic  female developped  R ankle injury following minor trauma





Ankle MRI showed  subcutaneous edema and enhancement along the lateral aspect 

of the distal leg 


 adjacent to the area of open wound characteristic of a cellulitis,  subtle 

edema and enhancement of the superficial fibers of the peroneus longus 


 muscle and there is mild edema and enhancement extending between the peroneal  

muscles, the soleus and lateral head of gastrocnemius muscle. This edema and 


 enhancement abuts the posterior aspect of the fibula without  findings to 

indicate osteomyelitis and no  fluid collection or abscess 





Sp Right ankle incision drainage excisional debridement ulcer with application 

of wound VAC by Dr Jones on 2/25


No osteo on op report


Clx positive for MSSA





Review of Systems


Except as stated in HPI:  all other systems reviewed are Neg





Past Family Social History


Allergies:  


Coded Allergies:  


     amlodipine (Verified  Allergy, Unknown, 2/23/18)


Past Medical History


 HTN and DM


Past Surgical History


  None





Active Ordered Medications


Medications where reviewed in EMR


Antibiotics Include:  levaquine


Family History


   No h/o DM or CAD


Social History


  Negative for alcohol, tobacco or drugs





Physical Exam


Vital Signs





Vital Signs








  Date Time  Temp Pulse Resp B/P (MAP) Pulse Ox O2 Delivery O2 Flow Rate FiO2


 


2/28/18 16:14  60      


 


2/28/18 16:08    179/81 (113)    


 


2/28/18 15:57 97.5 82 20 245/108 (153) 96   


 


2/28/18 14:48  89      


 


2/28/18 12:58  57      


 


2/28/18 11:36 98.3 61 20 188/80 (116) 97   


 


2/28/18 10:21  54      


 


2/28/18 07:57 98.6 65 20 180/79 (112) 96   


 


2/28/18 05:01 97.3 64 18 163/64 (97) 95   


 


2/28/18 00:14 98.2 54 18 159/72 (101) 96   


 


2/27/18 20:33 97.4 65 18 192/84 (120) 99   








Physical Exam


CONSTITUTIONAL/GENERAL: This is a morbidly obese female  patient, in no 

apparent distress.


TUBES/LINES/DRAINS:


SKIN: No jaundice, rashes, or lesions.   Skin temperature appropriate. Not 

diaphoretic. 


HEAD: Atraumatic. Normocephalic.


EYES: Pupils equal and round and reactive. Extraocular motions intact. No 

scleral icterus. No injection or drainage. Fundi not examined.


ENT: Hearing grossly normal. Nose without bleeding or purulent drainage. Throat 

without visible erythema, exudates, masses, or lesions.


NECK: Trachea midline. Supple, nontender. No palpable thyroid enlargement or 

nodularity. 


CARDIOVASCULAR: Regular rate and rhythm without murmurs, gallops, or rubs. No 

JVD. Peripheral pulses symmetric.


RESPIRATORY/CHEST: Symmetric, unlabored respirations. Clear to auscultation. 

Breath sounds equal bilaterally. No wheezes, rales, or rhonchi.  


GASTROINTESTINAL: Abdomen soft, non-tender, nondistended. No hepato-splenomegaly

, or palpable masses. No guarding. Bowel sounds present.


GENITOURINARY: Without palpable bladder distension. 


MUSCULOSKELETAL: Extremities without clubbing, cyanosis, or edema. No joint 

tenderness or effusion noted. No calf tenderness. 


R ankle with wound over lateral aspect  with VAC in place, serosang drainage


Very mild edema, erythema aournd


LYMPHATICS: No palpable cervical or supraclavicular adenopathy


No inguinal lymphadenopathy.


NEUROLOGICAL: Awake and alert. Motor and sensory grossly within normal limits. 

Follows commands. Clear speech. Moves all extremities.


PSYCHIATRIC: No obvious anxiety/depression. no apparent hallucinations or other 

psychotic thought process.


Laboratory





Laboratory Tests








Test


  2/28/18


10:07 2/28/18


13:29


 


Troponin I LESS THAN 0.02  LESS THAN 0.02 


 


Blood Urea Nitrogen  15 


 


Creatinine  0.87 


 


Random Glucose  102 


 


Calcium Level  9.5 


 


Sodium Level  138 


 


Potassium Level  3.2 


 


Chloride Level  102 


 


Carbon Dioxide Level  29.4 


 


Anion Gap  7 


 


Estimat Glomerular Filtration


Rate 


  81 


 














 Date/Time


Source Procedure


Growth Status


 


 


 2/25/18 10:05


Fluid Other Fungal Smear - Final


NO FUNGAL ELEMENTS SEEN. Resulted


 


 2/25/18 10:05


Fluid Other Fungal Culture


Pending Resulted





 2/23/18 20:31


Wound Leg Gram Stain - Final Complete


 


 2/23/18 20:31 Wound Culture - Final


Staphylococcus Aureus Complete








Result Diagram:  


2/24/18 1623                                                                   

             2/28/18 1329





Imaging





Last Impressions








Chest X-Ray 2/28/18 0000 Signed





Impressions: 





 Service Date/Time:  Wednesday, February 28, 2018 08:46 - CONCLUSION:  No acute 





 cardiopulmonary abnormality is identified.     Elbert Holden MD 


 


Ankle MRI 2/24/18 0000 Signed





Impressions: 





 Service Date/Time:  Saturday, February 24, 2018 17:34 - CONCLUSION:  1. There 

is 





 subcutaneous edema and enhancement along the lateral aspect of the distal leg 





 adjacent to the area of open wound characteristic of a cellulitis. 2. There is 





 subtle edema and enhancement of the superficial fibers of the peroneus longus 





 muscle and there is mild edema and enhancement extending between the peroneal 





 muscles, the soleus and lateral head of gastrocnemius muscle. This edema and 





 enhancement abuts the posterior aspect of the fibula. However, there are no 





 findings to indicate osteomyelitis. 3. No drainable fluid collection or 

abscess 





 is present.     Elbert Holden MD 


 


Tibia/Fibula X-Ray 2/23/18 0000 Signed





Impressions: 





 Service Date/Time:  Friday, February 23, 2018 19:30 - CONCLUSION:  1. No plain 





 radiograph evidence for osteomyelitis as questioned.     Marlo Montilla MD 











Assessment and Plan


Assessment and Plan


MSSA  infection R ankle 


   - infection of moderate severe ty requiring debridement 


DM








- dc levaquine


- will cont IV tx x 2 weeks, might need additional po abx if needed (keflex 500 

mg po qid) after IV treatmetn completed (total 2-4 weeks)











Narcisa Stoner MD Feb 28, 2018 18:54

## 2018-02-28 NOTE — HHI.PR
Subjective


Remarks


The patient was sitting on a chair.  She was hopeful to go home later today.  

She noted chest pressure that she has experienced before.  She was wondering if 

it was related to her diltiazem. She had no other acute complaints.





Objective


Vitals





Vital Signs








  Date Time  Temp Pulse Resp B/P (MAP) Pulse Ox O2 Delivery O2 Flow Rate FiO2


 


2/28/18 10:21  54      


 


2/28/18 07:57 98.6 65 20 180/79 (112) 96   


 


2/28/18 05:01 97.3 64 18 163/64 (97) 95   


 


2/28/18 00:14 98.2 54 18 159/72 (101) 96   


 


2/27/18 20:33 97.4 65 18 192/84 (120) 99   


 


2/27/18 17:49 98.2 65 18 199/88 (125) 98   


 


2/27/18 16:16    182/86 (118)    


 


2/27/18 15:00  64      


 


2/27/18 14:19 97.7 59 16 198/93 (128) 99   


 


2/27/18 13:18    197/91 (126)    


 


2/27/18 11:24 97.7 71 16 217/91 (133) 96   














I/O      


 


 2/27/18 2/27/18 2/27/18 2/28/18 2/28/18 2/28/18





 07:00 15:00 23:00 07:00 15:00 23:00


 


Intake Total 800 ml 150 ml 1000 ml   


 


Output Total   900 ml   


 


Balance 800 ml 150 ml 100 ml   


 


      


 


Intake Oral 800 ml  750 ml   


 


IV Total  150 ml 250 ml   


 


Output Urine Total   900 ml   


 


# Voids 3     








Result Diagram:  


2/24/18 1623                                                                   

             2/27/18 0540





Imaging





Last Impressions








Chest X-Ray 2/28/18 0000 Signed





Impressions: 





 Service Date/Time:  Wednesday, February 28, 2018 08:46 - CONCLUSION:  No acute 





 cardiopulmonary abnormality is identified.     Elbert Holden MD 


 


Ankle MRI 2/24/18 0000 Signed





Impressions: 





 Service Date/Time:  Saturday, February 24, 2018 17:34 - CONCLUSION:  1. There 

is 





 subcutaneous edema and enhancement along the lateral aspect of the distal leg 





 adjacent to the area of open wound characteristic of a cellulitis. 2. There is 





 subtle edema and enhancement of the superficial fibers of the peroneus longus 





 muscle and there is mild edema and enhancement extending between the peroneal 





 muscles, the soleus and lateral head of gastrocnemius muscle. This edema and 





 enhancement abuts the posterior aspect of the fibula. However, there are no 





 findings to indicate osteomyelitis. 3. No drainable fluid collection or 

abscess 





 is present.     Elbert Holden MD 


 


Tibia/Fibula X-Ray 2/23/18 0000 Signed





Impressions: 





 Service Date/Time:  Friday, February 23, 2018 19:30 - CONCLUSION:  1. No plain 





 radiograph evidence for osteomyelitis as questioned.     Marlo Montilla MD 








Objective Remarks


GENERAL: This is a well-nourished, well-developed patient, in no apparent 

distress.


CARDIOVASCULAR: Normal rate and regular rhythm without murmurs, gallops, or 

rubs. 


RESPIRATORY: Good respiratory efforts. Breath sounds equal and clear to 

auscultation bilaterally.


GASTROINTESTINAL: Abdomen soft, non-tender, non-distended. Normal active bowel 

sounds.


MUSCULOSKELETAL: Posterior lateral right leg with an ulceration.  Wound VAC in 

place.   


NEURO:  Alert & Oriented x4 to person, place, time, situation.  Moves all ext x4


PSYCH: Appropriate mood and affect.


Procedures


2/25/18 I&D right ankle ulcer with wound VAC application


Medications and IVs





Current Medications








 Medications


  (Trade)  Dose


 Ordered  Sig/Padmini


 Route  Start Time


 Stop Time Status Last Admin


 


  (D50w (Vial) Inj)  50 ml  UNSCH  PRN


 IV PUSH  2/23/18 22:00


     


 


 


  (Glucagon Inj)  1 mg  UNSCH  PRN


 OTHER  2/23/18 22:00


     


 


 


  (NovoLOG


 SUPPLEMENTAL


 SCALE)  1  ACHS SLIDING  SCALE


 SQ  2/24/18 08:00


    2/25/18 21:12


 


 


  (NS Flush)  2 ml  UNSCH  PRN


 IV FLUSH  2/23/18 22:00


     


 


 


  (NS Flush)  2 ml  BID


 IV FLUSH  2/24/18 09:00


    2/28/18 08:26


 


 


  (Zofran Inj)  4 mg  Q6H  PRN


 IVP  2/23/18 22:00


     


 


 


  (Heparin Inj)  5,000 units  Q12H


 SQ  2/24/18 09:00


    2/28/18 08:25


 


 


  (Tylenol)  650 mg  Q6H  PRN


 PO  2/23/18 22:00


    2/25/18 14:06


 


 


  (Morphine Inj)  2 mg  Q3H  PRN


 IV PUSH  2/23/18 22:00


     


 


 


  (Irma-Colace)  1 tab  BID


 PO  2/24/18 09:00


    2/28/18 08:22


 


 


  (Milk Of


 Magnesia Liq)  30 ml  Q12H  PRN


 PO  2/23/18 22:00


     


 


 


  (Senokot)  17.2 mg  Q12H  PRN


 PO  2/23/18 22:00


     


 


 


  (Dulcolax Supp)  10 mg  DAILY  PRN


 RECTAL  2/23/18 22:00


     


 


 


  (Lactulose Liq)  30 ml  DAILY  PRN


 PO  2/23/18 22:00


     


 


 


  (Cardizem)  60 mg  QID


 PO  2/24/18 09:00


    2/28/18 08:22


 


 


  (Hydrodiuril)  50 mg  DAILY


 PO  2/24/18 09:00


    2/28/18 08:21


 


 


  (Levemir Inj)  34 units  HS


 SQ  2/23/18 22:00


    2/27/18 20:42


 


 


  (Glucophage)  1,000 mg  BIDPC


 PO  2/24/18 09:00


    2/28/18 08:22


 


 


  (Synthroid)  150 mcg  DAILY@0600


 PO  2/24/18 06:00


    2/28/18 06:11


 


 


  (Synthroid)  25 mcg  DAILY@0600


 PO  2/24/18 06:00


    2/28/18 06:11


 


 


  (Gentamicin 0.1%


 Cream)  1 applic  DAILY


 TOPICAL  2/24/18 15:45


    2/28/18 08:26


 


 


  (Motrin)  600 mg  Q8H  PRN


 PO  2/26/18 11:30


    2/27/18 20:40


 


 


 Levofloxacin/


 Dextrose  150 ml @ 


 100 mls/hr  Q24H


 IV  2/26/18 13:00


    2/27/18 12:37


 


 


  (Vasotec Inj)  1.25 mg  Q6H  PRN


 IV PUSH  2/26/18 16:15


    2/27/18 12:37


 


 


  (Prinivil)  10 mg  BID


 PO  2/27/18 09:00


    2/28/18 08:23


 


 


  (Tylenol-Codeine


 #3)  1 tab  Q6H  PRN


 PO  2/27/18 21:45


    2/27/18 22:15


 











A/P


Problem List:  


(1) Diabetic foot infection


ICD Code:  E11.69 - Type 2 diabetes mellitus with other specified complication; 

L08.9 - Local infection of the skin and subcutaneous tissue, unspecified


(2) HTN (hypertension)


ICD Code:  I10 - Essential (primary) hypertension


(3) DM (diabetes mellitus)


ICD Code:  E11.9 - Type 2 diabetes mellitus without complications


Assessment and Plan


Ulcerative lesion


Right lower extremity. This has been present for 2-3 months.  Patient admits 

she has not been compliant with getting appropriate wound care.  X-ray shows no 

sign of osteomyelitis.  Wound culture grew MSSA.  Sensitive to Levaquin which 

the patient has been on.  Appreciate podiatry recommendations. S/P I&D.


- Wound vac to be changed per podiatry.  


- Case management following.  


- ID consult requested by podiatry.





Chest pressure


Slight, chronic. EKG consistent with early repolarization. CXR unremarkable.


- trend trops and EKGs.


- telemetry.





Hypertension


Blood pressure poorly controlled.


- Continue diltiazem, HCTZ, lisinopril.





Diabetes mellitus


Glucose well controlled.


- Monitor Accu-Cheks and cover with sliding scale insulin.  


- Continue Levemir, metformin.





Hypokalemia


Likely related to HCTZ.


- monitor and replete. 





DVT prophylaxis: Heparin


Discharge Planning


Awaiting ID Mati Jose DO Feb 28, 2018 11:25

## 2018-02-28 NOTE — HHI.FF
Face to Face Verification


Diagnosis:  


(1) Diabetic foot infection


(2) Uncontrolled hypertension


(3) Diabetic leg ulcer


Home Health Nursing








Order: Medical education





 Signs/symptoms of disease process





 Diabetic education





 Medication education-adverse effect





 Wound care and dressing changes





 Nursing assessment with vital signs








Instructions:


Wound vac dressing changes three times per week











I have seen patient Rafaela Rai on 2/28/18. My clinical findings support 

the need for the requested home health care services because:








 Ltd mobility - disease progression





 Deconditioned w/ increased weakness





 Limited ability to care for self





 Infection w/ risk of complications














I certify that my clinical findings support that this patient is homebound 

because:








 Post-op weakness





 Unsteady gait/balance





 Unsafe to leave home unassisted

















Mati Newberry DO Feb 28, 2018 11:46

## 2018-03-01 VITALS
OXYGEN SATURATION: 94 % | DIASTOLIC BLOOD PRESSURE: 81 MMHG | SYSTOLIC BLOOD PRESSURE: 191 MMHG | TEMPERATURE: 98.3 F | HEART RATE: 55 BPM | RESPIRATION RATE: 18 BRPM

## 2018-03-01 VITALS
RESPIRATION RATE: 18 BRPM | HEART RATE: 64 BPM | OXYGEN SATURATION: 90 % | TEMPERATURE: 98.1 F | DIASTOLIC BLOOD PRESSURE: 60 MMHG | SYSTOLIC BLOOD PRESSURE: 125 MMHG

## 2018-03-01 VITALS
HEART RATE: 57 BPM | SYSTOLIC BLOOD PRESSURE: 177 MMHG | OXYGEN SATURATION: 98 % | RESPIRATION RATE: 20 BRPM | DIASTOLIC BLOOD PRESSURE: 79 MMHG | TEMPERATURE: 97.9 F

## 2018-03-01 VITALS
DIASTOLIC BLOOD PRESSURE: 78 MMHG | RESPIRATION RATE: 18 BRPM | SYSTOLIC BLOOD PRESSURE: 180 MMHG | HEART RATE: 56 BPM | OXYGEN SATURATION: 97 % | TEMPERATURE: 98.1 F

## 2018-03-01 VITALS
HEART RATE: 81 BPM | DIASTOLIC BLOOD PRESSURE: 82 MMHG | OXYGEN SATURATION: 96 % | SYSTOLIC BLOOD PRESSURE: 168 MMHG | RESPIRATION RATE: 16 BRPM | TEMPERATURE: 98.3 F

## 2018-03-01 VITALS
OXYGEN SATURATION: 97 % | SYSTOLIC BLOOD PRESSURE: 182 MMHG | DIASTOLIC BLOOD PRESSURE: 82 MMHG | TEMPERATURE: 97.2 F | HEART RATE: 58 BPM | RESPIRATION RATE: 20 BRPM

## 2018-03-01 VITALS — HEART RATE: 57 BPM

## 2018-03-01 VITALS — OXYGEN SATURATION: 93 %

## 2018-03-01 LAB
BUN SERPL-MCNC: 15 MG/DL (ref 7–18)
CALCIUM SERPL-MCNC: 9.6 MG/DL (ref 8.5–10.1)
CHLORIDE SERPL-SCNC: 103 MEQ/L (ref 98–107)
CREAT SERPL-MCNC: 0.83 MG/DL (ref 0.5–1)
ERYTHROCYTE [DISTWIDTH] IN BLOOD BY AUTOMATED COUNT: 14.9 % (ref 11.6–17.2)
GFR SERPLBLD BASED ON 1.73 SQ M-ARVRAT: 86 ML/MIN (ref 89–?)
GLUCOSE SERPL-MCNC: 84 MG/DL (ref 74–106)
HCO3 BLD-SCNC: 27.6 MEQ/L (ref 21–32)
HCT VFR BLD CALC: 35.2 % (ref 35–46)
HGB BLD-MCNC: 12.1 GM/DL (ref 11.6–15.3)
MAGNESIUM SERPL-MCNC: 1.9 MG/DL (ref 1.5–2.5)
MCH RBC QN AUTO: 29.1 PG (ref 27–34)
MCHC RBC AUTO-ENTMCNC: 34.4 % (ref 32–36)
MCV RBC AUTO: 84.6 FL (ref 80–100)
PLATELET # BLD: 378 TH/MM3 (ref 150–450)
PMV BLD AUTO: 7.4 FL (ref 7–11)
RBC # BLD AUTO: 4.16 MIL/MM3 (ref 4–5.3)
SODIUM SERPL-SCNC: 139 MEQ/L (ref 136–145)
WBC # BLD AUTO: 6.1 TH/MM3 (ref 4–11)

## 2018-03-01 RX ADMIN — LISINOPRIL SCH MG: 20 TABLET ORAL at 08:48

## 2018-03-01 RX ADMIN — ACYCLOVIR SCH UNITS: 800 TABLET ORAL at 20:43

## 2018-03-01 RX ADMIN — STANDARDIZED SENNA CONCENTRATE AND DOCUSATE SODIUM SCH TAB: 8.6; 5 TABLET, FILM COATED ORAL at 20:44

## 2018-03-01 RX ADMIN — INSULIN ASPART SCH: 100 INJECTION, SOLUTION INTRAVENOUS; SUBCUTANEOUS at 17:03

## 2018-03-01 RX ADMIN — INSULIN ASPART SCH: 100 INJECTION, SOLUTION INTRAVENOUS; SUBCUTANEOUS at 20:44

## 2018-03-01 RX ADMIN — STANDARDIZED SENNA CONCENTRATE AND DOCUSATE SODIUM SCH TAB: 8.6; 5 TABLET, FILM COATED ORAL at 08:48

## 2018-03-01 RX ADMIN — Medication SCH ML: at 08:49

## 2018-03-01 RX ADMIN — INSULIN ASPART SCH: 100 INJECTION, SOLUTION INTRAVENOUS; SUBCUTANEOUS at 11:30

## 2018-03-01 RX ADMIN — LEVOFLOXACIN SCH MLS/HR: 5 INJECTION, SOLUTION INTRAVENOUS at 12:09

## 2018-03-01 RX ADMIN — STANDARDIZED SENNA CONCENTRATE AND DOCUSATE SODIUM SCH TAB: 8.6; 5 TABLET, FILM COATED ORAL at 22:43

## 2018-03-01 RX ADMIN — LEVOTHYROXINE SODIUM SCH MCG: 150 TABLET ORAL at 05:15

## 2018-03-01 RX ADMIN — CEPHALEXIN SCH MG: 500 CAPSULE ORAL at 22:44

## 2018-03-01 RX ADMIN — Medication SCH ML: at 20:44

## 2018-03-01 RX ADMIN — INSULIN ASPART SCH: 100 INJECTION, SOLUTION INTRAVENOUS; SUBCUTANEOUS at 08:08

## 2018-03-01 RX ADMIN — GENTAMICIN SULFATE SCH APPLIC: 1 CREAM TOPICAL at 09:00

## 2018-03-01 RX ADMIN — DILTIAZEM HYDROCHLORIDE SCH MG: 180 CAPSULE, EXTENDED RELEASE ORAL at 08:48

## 2018-03-01 RX ADMIN — HEPARIN SODIUM SCH UNITS: 10000 INJECTION, SOLUTION INTRAVENOUS; SUBCUTANEOUS at 20:43

## 2018-03-01 RX ADMIN — LISINOPRIL SCH MG: 20 TABLET ORAL at 20:42

## 2018-03-01 RX ADMIN — IBUPROFEN PRN MG: 600 TABLET, FILM COATED ORAL at 20:39

## 2018-03-01 RX ADMIN — ACETAMINOPHEN AND CODEINE PHOSPHATE PRN TAB: 300; 30 TABLET ORAL at 22:43

## 2018-03-01 RX ADMIN — LEVOTHYROXINE SODIUM SCH MCG: 25 TABLET ORAL at 05:15

## 2018-03-01 RX ADMIN — HYDROCHLOROTHIAZIDE SCH MG: 50 TABLET ORAL at 08:49

## 2018-03-01 RX ADMIN — HEPARIN SODIUM SCH UNITS: 10000 INJECTION, SOLUTION INTRAVENOUS; SUBCUTANEOUS at 08:49

## 2018-03-01 RX ADMIN — IBUPROFEN PRN MG: 600 TABLET, FILM COATED ORAL at 09:00

## 2018-03-01 NOTE — EKG
Date Performed: 02/28/2018       Time Performed: 08:37:11

 

PTAGE:      57 years

 

EKG:      SINUS BRADYCARDIA ST ELEVATION, PROBABLY EARLY REPOLARIZATION MODERATE T-WAVE ABNORMALITY, 
CONSIDER LATERAL ISCHEMIA ABNORMAL ECG Since the prior tracing, there has been no significant change 


 

 PREVIOUS TRACING            : 02/25/2018 05.49

 

DOCTOR:   Debbie Chacon  Interpretating Date/Time  03/01/2018 11:34:24

## 2018-03-01 NOTE — HHI.DS
__________________________________________________





Discharge Summary


Admission Date


Feb 25, 2018 at 12:53


Discharge Date:  Mar 2, 2018


Admitting Diagnosis








(1) Diabetic foot infection


ICD Code:  E11.69 - Type 2 diabetes mellitus with other specified complication; 

L08.9 - Local infection of the skin and subcutaneous tissue, unspecified


Diagnosis:  Principal





(2) HTN (hypertension)


ICD Code:  I10 - Essential (primary) hypertension


Diagnosis:  Principal





(3) DM (diabetes mellitus)


ICD Code:  E11.9 - Type 2 diabetes mellitus without complications


Diagnosis:  Principal





Procedures


2/25/18 I&D right ankle ulcer with wound VAC application


Brief History - From Admission


This is a 57-year-old female with a PMH of HTN and DM who presented to the ER 

with complaints of right leg wound x2-3 months.  States symptoms have been 

intermittent over the last several months, has not sought medical attention 

until now.  Denies fever or chills.  States she was seen at Urgent Care today 

and referred to the ER.  Reports associated leg pain, sharp, intermittent, 6/10

, non-radiating, worse w/ movement/touch.  On arrival, /105, HR 59, O2 

sat 98% on RA, Afebrile.  CBC unremarkable.  Chemistry essentially 

unremarkable.  CRP 0.82.  I 1.0.  Tib-fib X-ray with no evidence of 

osteoarthritis.  S/p Vanc in ER.


CBC/BMP:  


3/1/18 0630                                                                    

            3/1/18 0630





Significant Findings





Laboratory Tests








Test


  2/27/18


05:40 2/28/18


10:07 2/28/18


13:29 3/1/18


06:30


 


Potassium Level


  3.2 MEQ/L


(3.5-5.1) 


  3.2 MEQ/L


(3.5-5.1) 


 


 


Troponin I


  


  LESS THAN 0.02


NG/ML LESS THAN 0.02


NG/ML 


 


 


Estimat Glomerular Filtration


Rate 


  


  81 ML/MIN


(>89) 86 ML/MIN


(>89)








Imaging





Last Impressions








Chest X-Ray 2/28/18 0000 Signed





Impressions: 





 Service Date/Time:  Wednesday, February 28, 2018 08:46 - CONCLUSION:  No acute 





 cardiopulmonary abnormality is identified.     Elbert Holden MD 


 


Ankle MRI 2/24/18 0000 Signed





Impressions: 





 Service Date/Time:  Saturday, February 24, 2018 17:34 - CONCLUSION:  1. There 

is 





 subcutaneous edema and enhancement along the lateral aspect of the distal leg 





 adjacent to the area of open wound characteristic of a cellulitis. 2. There is 





 subtle edema and enhancement of the superficial fibers of the peroneus longus 





 muscle and there is mild edema and enhancement extending between the peroneal 





 muscles, the soleus and lateral head of gastrocnemius muscle. This edema and 





 enhancement abuts the posterior aspect of the fibula. However, there are no 





 findings to indicate osteomyelitis. 3. No drainable fluid collection or 

abscess 





 is present.     Elbert Holden MD 


 


Tibia/Fibula X-Ray 2/23/18 0000 Signed





Impressions: 





 Service Date/Time:  Friday, February 23, 2018 19:30 - CONCLUSION:  1. No plain 





 radiograph evidence for osteomyelitis as questioned.     Marlo Montilla MD 








PE at Discharge


GENERAL: This is a well-nourished, well-developed patient, in no apparent 

distress.


CARDIOVASCULAR: Normal rate and regular rhythm without murmurs, gallops, or 

rubs. 


RESPIRATORY: Good respiratory efforts. Breath sounds equal and clear to 

auscultation bilaterally.


GASTROINTESTINAL: Abdomen soft, non-tender, non-distended. Normal active bowel 

sounds.


MUSCULOSKELETAL: Posterior lateral right leg with an ulceration.  Wound VAC in 

place.   


NEURO:  Alert & Oriented x4 to person, place, time, situation.  Moves all ext x4


PSYCH: Appropriate mood and affect.


Pt update on day of discharge


The patient was excited about going home.  She said that she will try to get 

established with a primary care doctor.  She says the hydralazine has been 

working for her blood pressure.  Discussed with nursing.


Hospital Course


Ulcerative lesion


Involving the right lower extremity. This has been present for 2-3 months.  

Patient admits she has not been compliant with getting appropriate wound care.  

X-ray shows no sign of osteomyelitis. MRI showed:  There is subcutaneous edema 

and enhancement along the lateral aspect of the distal leg adjacent to the area 

of open wound characteristic of a cellulitis; There is subtle edema and 

enhancement of the superficial fibers of the peroneus longus muscle and there 

is mild edema and enhancement extending between the peroneal muscles, the 

soleus and lateral head of gastrocnemius muscle; This edema and enhancement 

abuts the posterior aspect of the fibula. However, there are no findings to 

indicate osteomyelitis; No drainable fluid collection or abscess is present.  

Wound culture grew MSSA.  Sensitive to Levaquin which the patient has been 

started on.  Podiatry was consulted. S/P I&D and wound vac placement. ID was 

consulted and recommended IV antibiotics upon discharge. The pt refused to go 

home with IV antibiotics. ID was contacted again and recommended Keflex for 

three weeks if the pt refused IV antibiotics. Wound care removed the wound vac 

and the pt will be discharged with dressing changes. She will follow up with 

podiatry as an outpt. 





Chest pressure


Slight, and chronic in nature. The pt says she has had this when her diltiazem 

dose was changed. EKG consistent with early repolarization. CXR unremarkable. 

Troponins were negative. She was monitored on telemetry. Her diltiazem dose was 

changed to her home regimen. Her chest pressure resolved. She will follow up 

with her PCP.





Hypertension


Blood pressure poorly controlled. We continued diltiazem and HCTZ. We increased 

lisinopril. Her blood pressure improved with addition on hydralazine. She will 

follow up with her PCP.





Diabetes mellitus


Glucose well controlled. She will continue her home regimen upon discharge. 





Hypokalemia


Improved with repletion.


Pt Condition on Discharge:  Stable


Discharge Disposition:  Disch w/ Home Health Serv


Discharge Time:  > 30 minutes


Discharge Instructions


DIET: Follow Instructions for:  Diabetic Diet


Activities you can perform:  See Additionl Instruction


Follow up Referrals:  


Appointment for Follow Up


PCP Follow-up - 1 Week


PCP Follow-up


Podiatry - 1 Week @ [facility] with Giovanni Jones DPM





New Medications:  


Cephalexin (Keflex) 500 Mg Capsule


500 MG PO QID for Infection for 21 Days, CAP 0 Refills





Hydralazine HCl (Hydralazine HCl) 25 Mg Tablet


25 MG PO Q8HR for Blood Pressure Management, #90 TAB





Lisinopril (Lisinopril) 20 Mg Tab


20 MG PO BID for Blood Pressure Management, #60 TAB





 


Continued Medications:  


Diltiazem ER 24 HR (Diltiazem ER 24 HR) 180 Mg Thu


180 MG PO DAILY, #30 TAB 0 Refills





Hydrochlorothiazide (Hydrochlorothiazide) 50 Mg Tab


50 MG PO DAILY, #60 TAB 0 Refills





Insulin Glargine Inj (Lantus Inj) 1,000 Unit/10 Ml Vial


34 UNITS SQ HS for Blood Sugar Management, VIAL 0 Refills





Insulin Lispro (Human) Inj (Humalog Inj) 1,000 Unit/10 Ml Vial


2-12 UNITS SQ ACHS for Blood Sugar Management, #1 VIAL 0 Refills


Max dose at bedtime:(  )units; sugars < 70,(0)units; sugars


 150-199,(2)units; sugars 200-249,(4)units; sugars 250-299,(7)units;


 sugars 300-349,(10)units; sugars more than 349,(12)units.


Levothyroxine (Synthroid) 175 Mcg Tab


175 MCG PO DAILY for Thyroid, #30 TAB 0 Refills





Metformin (Metformin) 1,000 Mg Tab


1000 MG PO BIDPC for Blood Sugar Management, #60 TAB 0 Refills





 


Discontinued Medications:  


Quinapril (Quinapril) 5 Mg Tab


5 MG PO BID, #60 TAB 0 Refills

















Mati Newberry DO Mar 1, 2018 11:53

## 2018-03-01 NOTE — HHI.PR
Subjective


Remarks


The patient wanted to go home.  She did not want to have IV antibiotics when 

she was discharged.  She denied any chest pain or pressure.  She had family at 

bedside.  Discussed with nursing.





Objective


Vitals





Vital Signs








  Date Time  Temp Pulse Resp B/P (MAP) Pulse Ox O2 Delivery O2 Flow Rate FiO2


 


3/1/18 16:36 97.2 58 20 182/82 (115) 97   


 


3/1/18 12:15 98.3 55 18 191/81 (117) 94   


 


3/1/18 11:00  57      


 


3/1/18 08:16 98.1 56 18 180/78 (112) 97   


 


3/1/18 04:00 97.9 57 20 177/79 (111) 98   


 


3/1/18 00:30     93   


 


3/1/18 00:00  64      


 


3/1/18 00:00 98.1 74 18 125/60 (81) 90   


 


2/28/18 20:15  65      


 


2/28/18 20:00 97.0 67 18 178/75 (109) 99   














I/O      


 


 2/28/18 2/28/18 2/28/18 3/1/18 3/1/18 3/1/18





 07:00 15:00 23:00 07:00 15:00 23:00


 


Intake Total  1090 ml 240 ml   720 ml


 


Balance  1090 ml 240 ml   720 ml


 


      


 


Intake Oral  940 ml 240 ml   720 ml


 


IV Total  150 ml    


 


# Voids  3 1 5  3


 


# Bowel Movements  1    0








Result Diagram:  


3/1/18 0630                                                                    

            3/1/18 0630





Imaging





Last Impressions








Chest X-Ray 2/28/18 0000 Signed





Impressions: 





 Service Date/Time:  Wednesday, February 28, 2018 08:46 - CONCLUSION:  No acute 





 cardiopulmonary abnormality is identified.     Elbert Holden MD 


 


Ankle MRI 2/24/18 0000 Signed





Impressions: 





 Service Date/Time:  Saturday, February 24, 2018 17:34 - CONCLUSION:  1. There 

is 





 subcutaneous edema and enhancement along the lateral aspect of the distal leg 





 adjacent to the area of open wound characteristic of a cellulitis. 2. There is 





 subtle edema and enhancement of the superficial fibers of the peroneus longus 





 muscle and there is mild edema and enhancement extending between the peroneal 





 muscles, the soleus and lateral head of gastrocnemius muscle. This edema and 





 enhancement abuts the posterior aspect of the fibula. However, there are no 





 findings to indicate osteomyelitis. 3. No drainable fluid collection or 

abscess 





 is present.     Elbert Holden MD 


 


Tibia/Fibula X-Ray 2/23/18 0000 Signed





Impressions: 





 Service Date/Time:  Friday, February 23, 2018 19:30 - CONCLUSION:  1. No plain 





 radiograph evidence for osteomyelitis as questioned.     Marlo Montilla MD 








Objective Remarks


GENERAL: This is a well-nourished, well-developed patient, in no apparent 

distress.


CARDIOVASCULAR: Normal rate and regular rhythm without murmurs, gallops, or 

rubs. 


RESPIRATORY: Good respiratory efforts. Breath sounds equal and clear to 

auscultation bilaterally.


GASTROINTESTINAL: Abdomen soft, non-tender, non-distended. Normal active bowel 

sounds.


MUSCULOSKELETAL: Posterior lateral right leg with an ulceration.  Wound VAC in 

place.   


NEURO:  Alert & Oriented x4 to person, place, time, situation.  Moves all ext x4


PSYCH: Appropriate mood and affect.


Procedures


2/25/18 I&D right ankle ulcer with wound VAC application


Medications and IVs





Current Medications








 Medications


  (Trade)  Dose


 Ordered  Sig/Padmini


 Route  Start Time


 Stop Time Status Last Admin


 


  (D50w (Vial) Inj)  50 ml  UNSCH  PRN


 IV PUSH  2/23/18 22:00


     


 


 


  (Glucagon Inj)  1 mg  UNSCH  PRN


 OTHER  2/23/18 22:00


     


 


 


  (NovoLOG


 SUPPLEMENTAL


 SCALE)  1  ACHS SLIDING  SCALE


 SQ  2/24/18 08:00


    2/28/18 20:46


 


 


  (NS Flush)  2 ml  UNSCH  PRN


 IV FLUSH  2/23/18 22:00


     


 


 


  (NS Flush)  2 ml  BID


 IV FLUSH  2/24/18 09:00


    3/1/18 08:49


 


 


  (Zofran Inj)  4 mg  Q6H  PRN


 IVP  2/23/18 22:00


     


 


 


  (Heparin Inj)  5,000 units  Q12H


 SQ  2/24/18 09:00


    3/1/18 08:49


 


 


  (Tylenol)  650 mg  Q6H  PRN


 PO  2/23/18 22:00


    2/25/18 14:06


 


 


  (Morphine Inj)  2 mg  Q3H  PRN


 IV PUSH  2/23/18 22:00


    2/28/18 14:33


 


 


  (Irma-Colace)  1 tab  BID


 PO  2/24/18 09:00


    2/28/18 08:22


 


 


  (Milk Of


 Magnesia Liq)  30 ml  Q12H  PRN


 PO  2/23/18 22:00


     


 


 


  (Senokot)  17.2 mg  Q12H  PRN


 PO  2/23/18 22:00


     


 


 


  (Dulcolax Supp)  10 mg  DAILY  PRN


 RECTAL  2/23/18 22:00


     


 


 


  (Lactulose Liq)  30 ml  DAILY  PRN


 PO  2/23/18 22:00


     


 


 


  (Hydrodiuril)  50 mg  DAILY


 PO  2/24/18 09:00


    3/1/18 08:49


 


 


  (Levemir Inj)  34 units  HS


 SQ  2/23/18 22:00


    2/28/18 20:46


 


 


  (Glucophage)  1,000 mg  BIDPC


 PO  2/24/18 09:00


    3/1/18 08:49


 


 


  (Synthroid)  150 mcg  DAILY@0600


 PO  2/24/18 06:00


    3/1/18 05:15


 


 


  (Synthroid)  25 mcg  DAILY@0600


 PO  2/24/18 06:00


    3/1/18 05:15


 


 


  (Gentamicin 0.1%


 Cream)  1 applic  DAILY


 TOPICAL  2/24/18 15:45


    2/28/18 08:26


 


 


  (Motrin)  600 mg  Q8H  PRN


 PO  2/26/18 11:30


    3/1/18 09:00


 


 


  (Vasotec Inj)  1.25 mg  Q6H  PRN


 IV PUSH  2/26/18 16:15


    2/28/18 12:07


 


 


  (Tylenol-Codeine


 #3)  1 tab  Q6H  PRN


 PO  2/27/18 21:45


    2/28/18 20:55


 


 


  (Cardizem Cd)  180 mg  DAILY


 PO  3/1/18 09:00


    3/1/18 08:48


 


 


  (Prinivil)  20 mg  BID


 PO  2/28/18 21:00


    3/1/18 08:48


 











A/P


Problem List:  


(1) Diabetic foot infection


ICD Code:  E11.69 - Type 2 diabetes mellitus with other specified complication; 

L08.9 - Local infection of the skin and subcutaneous tissue, unspecified


(2) HTN (hypertension)


ICD Code:  I10 - Essential (primary) hypertension


(3) DM (diabetes mellitus)


ICD Code:  E11.9 - Type 2 diabetes mellitus without complications


Assessment and Plan


Ulcerative lesion


Right lower extremity. This has been present for 2-3 months.  Patient admits 

she has not been compliant with getting appropriate wound care.  X-ray shows no 

sign of osteomyelitis.  Wound culture grew MSSA.  Sensitive to Levaquin which 

the patient has been on.  Appreciate podiatry recommendations. S/P I&D. ID 

consult appreciated. The pt refused home IV antibiotics.


- Wound vac to be changed per podiatry.  


- Case management following and setting up home health care. 


- ID recommended course of PO Keflex as pt refused IV antibiotics.





Chest pressure


Slight, chronic. EKG consistent with early repolarization. CXR unremarkable. 

Trops negative. Resolved.


- telemetry.





Hypertension


Blood pressure poorly controlled.


- Continue diltiazem, HCTZ, lisinopril.


- add hydralazine 25 mg q8h.





Diabetes mellitus


Glucose well controlled.


- Monitor Accu-Cheks and cover with sliding scale insulin.  


- Continue Levemir, metformin.





Hypokalemia


Likely related to HCTZ.


- monitor and replete. 





DVT prophylaxis: Heparin


Discharge Planning


D/c when [facility] arranged











Mati Newberry DO Mar 1, 2018 16:44

## 2018-03-01 NOTE — HHI.FF
Face to Face Verification


Diagnosis:  


(1) Diabetic foot infection


(2) DM (diabetes mellitus)


(3) HTN (hypertension)


(4) Uncontrolled hypertension


(5) Diabetic leg ulcer


Home Health Nursing








Order: Medical education





 Signs/symptoms of disease process





 Diabetic education





 Medication education-adverse effect





 Wound care and dressing changes





 Nursing assessment with vital signs








Instructions:


Please change wound vac dressings every 48 hours











I have seen patient Rafaela Rai on 3/1/18. My clinical findings support 

the need for the requested home health care services because:








 Ltd mobility - disease progression





 Deconditioned w/ increased weakness





 Limited ability to care for self





 High risk of falls





 Infection w/ risk of complications














I certify that my clinical findings support that this patient is homebound 

because:








 Unsteady gait/balance





 Unsafe to leave home unassisted

















Mati Newberry DO Mar 1, 2018 11:37

## 2018-03-01 NOTE — EKG
Date Performed: 02/28/2018       Time Performed: 13:15:16

 

PTAGE:      57 years

 

EKG:      SINUS BRADYCARDIA ST ELEVATION, PROBABLY EARLY REPOLARIZATION MODERATE T-WAVE ABNORMALITY, 
CONSIDER LATERAL ISCHEMIA ABNORMAL ECG Since the prior tracing, there has been no significant change 


 

 PREVIOUS TRACING            : 02/28/2018 08.37

 

DOCTOR:   Debbie Chacon  Interpretating Date/Time  03/01/2018 11:34:49

## 2018-03-01 NOTE — HHI.DCPOC
Discharge Care Plan


Diagnosis:  


(1) Diabetic foot infection


(2) DM (diabetes mellitus)


(3) HTN (hypertension)


(4) Uncontrolled hypertension


(5) Diabetic leg ulcer


Goals to Promote Your Health


* To prevent worsening of your condition and complications


* To maintain your health at the optimal level


Directions to Meet Your Goals


*** Take your medications as prescribed


*** Follow your dietary instruction


*** Follow activity as directed








*** Keep your appointments as scheduled


*** Take your immunizations and boosters as scheduled


*** If your symptoms worsen call your PCP, if no PCP go to Urgent Care Center 

or Emergency Room***


*** Smoking is Dangerous to Your Health. Avoid second hand smoke***


***Call the 24-hour hour crisis hotline for domestic abuse at 1-437.823.1051***











Mati Newberry DO Mar 1, 2018 11:40

## 2018-03-02 VITALS
OXYGEN SATURATION: 99 % | SYSTOLIC BLOOD PRESSURE: 128 MMHG | DIASTOLIC BLOOD PRESSURE: 58 MMHG | RESPIRATION RATE: 18 BRPM | TEMPERATURE: 98.1 F | HEART RATE: 72 BPM

## 2018-03-02 VITALS
RESPIRATION RATE: 20 BRPM | TEMPERATURE: 97.7 F | OXYGEN SATURATION: 97 % | SYSTOLIC BLOOD PRESSURE: 143 MMHG | DIASTOLIC BLOOD PRESSURE: 67 MMHG | HEART RATE: 64 BPM

## 2018-03-02 VITALS
RESPIRATION RATE: 16 BRPM | OXYGEN SATURATION: 98 % | DIASTOLIC BLOOD PRESSURE: 66 MMHG | SYSTOLIC BLOOD PRESSURE: 145 MMHG | TEMPERATURE: 98.6 F | HEART RATE: 66 BPM

## 2018-03-02 RX ADMIN — HYDROCHLOROTHIAZIDE SCH MG: 50 TABLET ORAL at 08:32

## 2018-03-02 RX ADMIN — HEPARIN SODIUM SCH UNITS: 10000 INJECTION, SOLUTION INTRAVENOUS; SUBCUTANEOUS at 08:32

## 2018-03-02 RX ADMIN — CEPHALEXIN SCH MG: 500 CAPSULE ORAL at 06:25

## 2018-03-02 RX ADMIN — LISINOPRIL SCH MG: 20 TABLET ORAL at 08:32

## 2018-03-02 RX ADMIN — Medication SCH ML: at 08:33

## 2018-03-02 RX ADMIN — LEVOTHYROXINE SODIUM SCH MCG: 150 TABLET ORAL at 06:25

## 2018-03-02 RX ADMIN — INSULIN ASPART SCH: 100 INJECTION, SOLUTION INTRAVENOUS; SUBCUTANEOUS at 12:05

## 2018-03-02 RX ADMIN — LEVOTHYROXINE SODIUM SCH MCG: 25 TABLET ORAL at 06:24

## 2018-03-02 RX ADMIN — INSULIN ASPART SCH: 100 INJECTION, SOLUTION INTRAVENOUS; SUBCUTANEOUS at 08:34

## 2018-03-02 RX ADMIN — DILTIAZEM HYDROCHLORIDE SCH MG: 180 CAPSULE, EXTENDED RELEASE ORAL at 08:33

## 2018-03-02 RX ADMIN — STANDARDIZED SENNA CONCENTRATE AND DOCUSATE SODIUM SCH TAB: 8.6; 5 TABLET, FILM COATED ORAL at 08:33

## 2018-03-02 RX ADMIN — GENTAMICIN SULFATE SCH APPLIC: 1 CREAM TOPICAL at 08:35

## 2018-03-02 RX ADMIN — CEPHALEXIN SCH MG: 500 CAPSULE ORAL at 12:04

## 2018-03-02 NOTE — PD.WCN.NOT
Wound Consult


Description:


Assistance with wound VAC change


Communicated with:


Rebecca CONLEY [address] [place] [address], 


Recommendation:


1) Cleanse right lower extremity with normal saline pat dry.


2) Skin prep periwound.Apply Puracol AG to wound base cover with calcium 

alginate cut to fit wound base.


3) Secure with boarder gauze change every 5-7 days or as needed for dislodgement

/exudate.


4) Follow up with out patient wound care


Additional Information:


Patient was seen today on [address] [place] [address] by writer and Rebecca CONLEY [address] [place] [address] for wound vac 

change.Patient alert and oriented x3 ambulate with no assist.Dressing removed 

from R lateral tibial area to expose a open wound measuring 5.1cm x 5.2cm x 

0.2cm wound base is 100% granulated beefy red tissue scant bloody drainage.No 

signs or symptoms of infection.Wound cleansed with normal saline pat dry.Skin 

prep applied to periwound.Puracol AG applied to wound base covered with calcium 

alginate AG cut to fit wound base.Secured with boarder gauze signed and 

dated.Patient to follow up with  next week.











[facility] Mar 2, 2018 13:51

## 2018-04-27 ENCOUNTER — HOSPITAL ENCOUNTER (EMERGENCY)
Dept: HOSPITAL 17 - NEPE | Age: 58
Discharge: HOME | End: 2018-04-27
Payer: SELF-PAY

## 2018-04-27 VITALS
DIASTOLIC BLOOD PRESSURE: 93 MMHG | SYSTOLIC BLOOD PRESSURE: 196 MMHG | HEART RATE: 70 BPM | OXYGEN SATURATION: 99 % | TEMPERATURE: 97.4 F | RESPIRATION RATE: 16 BRPM

## 2018-04-27 VITALS — BODY MASS INDEX: 32.06 KG/M2 | HEIGHT: 66 IN | WEIGHT: 199.52 LBS

## 2018-04-27 DIAGNOSIS — Z79.4: ICD-10-CM
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
## 2018-04-27 DIAGNOSIS — E11.9: ICD-10-CM

## 2018-04-27 DIAGNOSIS — S81.801A: Primary | ICD-10-CM

## 2018-04-27 DIAGNOSIS — E07.9: ICD-10-CM

## 2018-04-27 PROCEDURE — 99283 EMERGENCY DEPT VISIT LOW MDM: CPT

## 2018-04-27 NOTE — PD
HPI


Chief Complaint:  Pain: Acute or Chronic


Time Seen by Provider:  09:03


Travel History


International Travel<30 days:  No


Contact w/Intl Traveler<30days:  No


Traveled to known affect area:  No





History of Present Illness


HPI


57-year-old female notes ongoing pain with her right ankle with an associated 

wound since February.  She states she had surgery with Dr. Valencia at the end 

of February.  She states she called to get an appointment but it is not until 

next week and she is wanting something for pain to hold her over.  She denies 

any fever.  She states her sugars have been normal and yesterday was 140.  She 

denies any trauma or other concurrent complaints.  Quality pain is sharp.  

Severity is moderate.  Pain is worse with movement.  She states the wound is 

healing appropriately.





PFSH


Past Medical History


Asthma:  No


Blood Disorders:  No


Heart Rhythm Problems:  No


Cancer:  No


Cardiovascular Problems:  Yes (ENLARGED HEART)


High Cholesterol:  No


Chemotherapy:  No


Chest Pain:  No


Congestive Heart Failure:  No


COPD:  No


Diabetes:  Yes


Endocrine:  Yes


Genitourinary:  No


Immune Disorder:  No


Musculoskeletal:  No


Neurologic:  No


Psychiatric:  No


Reproductive:  No


Respiratory:  No


Radiation Therapy:  No


Sleep Apnea:  No


Thyroid Disease:  Yes





Social History


Tobacco Use:  No


Substance Use:  No





Allergies-Medications


(Allergen,Severity, Reaction):  


Coded Allergies:  


     amlodipine (Verified  Allergy, Unknown, 2/23/18)


Reported Meds & Prescriptions





Reported Meds & Active Scripts


Active


Gabapentin 100 Mg Cap 100 Mg PO HS PRN


Hydralazine HCl 25 Mg Tablet 25 Mg PO Q8HR


Keflex (Cephalexin) 500 Mg Capsule 500 Mg PO QID 21 Days


Lisinopril 20 Mg Tab 20 Mg PO BID


Reported


Diltiazem ER 24  Mg Thu 180 Mg PO DAILY


Lantus Inj (Insulin Glargine) 1,000 Unit/10 Ml Vial 34 Units SQ HS


Humalog Inj (Insulin Human Lispro) 1,000 Unit/10 Ml Vial 2-12 Units SQ ACHS


     Max dose at bedtime:(  )units; sugars < 70,(0)units; sugars


     150-199,(2)units; sugars 200-249,(4)units; sugars 250-299,(7)units;


     sugars 300-349,(10)units; sugars more than 349,(12)units.


Hydrochlorothiazide 50 Mg Tab 50 Mg PO DAILY


Synthroid (Levothyroxine Sodium) 175 Mcg Tab 175 Mcg PO DAILY


Metformin (Metformin HCl) 1,000 Mg Tab 1,000 Mg PO BIDPC








Review of Systems


Except as stated in HPI:  all other systems reviewed are Neg





Physical Exam


Narrative


GENERAL: 55-year-old female in no apparent distress


SKIN: Patient has 2 right lateral lower leg above ankle a 4 x 6 cm healing 

lesion without active drainage or surrounding cellulitic changes


HEAD: Atraumatic. Normocephalic. 


EYES: Pupils equal and round. No scleral icterus. No injection or drainage. 


ENT: No nasal bleeding or discharge.  Mucous membranes pink and moist.


NECK: Trachea midline. 


CARDIOVASCULAR: Regular rate and rhythm.  


RESPIRATORY: No accessory muscle use. Clear to auscultation. Breath sounds 

equal bilaterally. 


MUSCULOSKELETAL: No obvious deformities. No clubbing.  No cyanosis.  Mild pain 

with palpation of right ankle wound without joint pain, neurovascularly intact, 

no lacerations over, compartments soft.


NEUROLOGICAL: Awake and alert. No obvious cranial nerve deficits.  Motor 

grossly within normal limits. Normal speech.


PSYCHIATRIC: Appropriate mood and affect; insight and judgment normal.





Data


Data


Last Documented VS





Vital Signs








  Date Time  Temp Pulse Resp B/P (MAP) Pulse Ox O2 Delivery O2 Flow Rate FiO2


 


4/27/18 08:49 97.4 70 16 196/93 (127) 99   








Orders





 Orders


Blood Glucose (4/27/18 09:10)


Ed Discharge Order (4/27/18 09:54)








MDM


Medical Decision Making


Medical Screen Exam Complete:  Yes


Emergency Medical Condition:  Yes


Medical Record Reviewed:  Yes (Past history confirmed)


Differential Diagnosis


Wound check, neuropathy pain, osteomyelitis, cellulitis


Narrative Course


Wound is well appearing.  Sugar is within normal limits.  Patient declined x-

ray imaging.  Requesting gabapentin for pain which will be given and advised to 

call Dr. Valencia to try to move up appointment.  Given return instructions.





Diagnosis





 Primary Impression:  


 Wound of right leg


 Qualified Codes:  S81.801A - Unspecified open wound, right lower leg, initial 

encounter


Patient Instructions:  General Instructions





***Additional Instructions:  


return as needed, tylenol as needed, follow with dr valencia


***Med/Other Pt SpecificInfo:  Prescription(s) given


Scripts


Gabapentin (Gabapentin) 100 Mg Cap


100 MG PO HS Y for PAIN SCALE 1 TO 10, #10 CAP 0 Refills


   Prov: Kathy Hill MD         4/27/18


Disposition:  01 DISCHARGE HOME


Condition:  Stable











Kathy Hill MD Apr 27, 2018 09:22